# Patient Record
Sex: MALE | Race: WHITE | NOT HISPANIC OR LATINO | Employment: OTHER | URBAN - METROPOLITAN AREA
[De-identification: names, ages, dates, MRNs, and addresses within clinical notes are randomized per-mention and may not be internally consistent; named-entity substitution may affect disease eponyms.]

---

## 2017-10-26 ENCOUNTER — TELEPHONE (OUTPATIENT)
Dept: ORTHOPEDIC SURGERY | Facility: CLINIC | Age: 62
End: 2017-10-26

## 2017-11-02 ENCOUNTER — OFFICE VISIT (OUTPATIENT)
Dept: ORTHOPEDIC SURGERY | Facility: CLINIC | Age: 62
End: 2017-11-02

## 2017-11-02 VITALS — BODY MASS INDEX: 27.62 KG/M2 | TEMPERATURE: 98.2 F | WEIGHT: 176 LBS | HEIGHT: 67 IN

## 2017-11-02 DIAGNOSIS — M54.40 BILATERAL LOW BACK PAIN WITH SCIATICA, SCIATICA LATERALITY UNSPECIFIED, UNSPECIFIED CHRONICITY: ICD-10-CM

## 2017-11-02 DIAGNOSIS — M75.121 COMPLETE TEAR OF RIGHT ROTATOR CUFF: Primary | ICD-10-CM

## 2017-11-02 PROCEDURE — 73030 X-RAY EXAM OF SHOULDER: CPT | Performed by: ORTHOPAEDIC SURGERY

## 2017-11-02 PROCEDURE — 99204 OFFICE O/P NEW MOD 45 MIN: CPT | Performed by: ORTHOPAEDIC SURGERY

## 2017-11-02 PROCEDURE — 20610 DRAIN/INJ JOINT/BURSA W/O US: CPT | Performed by: ORTHOPAEDIC SURGERY

## 2017-11-02 RX ORDER — LANOLIN ALCOHOL/MO/W.PET/CERES
800 CREAM (GRAM) TOPICAL EVERY MORNING
COMMUNITY

## 2017-11-02 RX ORDER — TADALAFIL 5 MG/1
5 TABLET ORAL DAILY PRN
COMMUNITY
End: 2018-05-18 | Stop reason: HOSPADM

## 2017-11-02 RX ORDER — GABAPENTIN 100 MG/1
100 CAPSULE ORAL NIGHTLY PRN
Refills: 0 | COMMUNITY
Start: 2017-10-24

## 2017-11-02 RX ORDER — CIMETIDINE 400 MG/1
TABLET, FILM COATED ORAL
Refills: 0 | COMMUNITY
Start: 2017-10-31 | End: 2017-12-29 | Stop reason: SDUPTHER

## 2017-11-02 RX ORDER — ALPRAZOLAM 1 MG/1
TABLET ORAL
Refills: 0 | COMMUNITY
Start: 2017-10-20 | End: 2017-12-29 | Stop reason: SDUPTHER

## 2017-11-02 RX ORDER — HYDROCODONE BITARTRATE AND ACETAMINOPHEN 10; 325 MG/1; MG/1
TABLET ORAL
Refills: 0 | COMMUNITY
Start: 2017-10-20 | End: 2017-12-29 | Stop reason: SDUPTHER

## 2017-11-02 RX ADMIN — METHYLPREDNISOLONE ACETATE 80 MG: 80 INJECTION, SUSPENSION INTRA-ARTICULAR; INTRALESIONAL; INTRAMUSCULAR; SOFT TISSUE at 12:26

## 2017-11-02 RX ADMIN — BUPIVACAINE HYDROCHLORIDE 4 ML: 5 INJECTION, SOLUTION EPIDURAL; INTRACAUDAL at 12:26

## 2017-11-02 NOTE — PROGRESS NOTES
New Shoulder      Patient: Chandu Yost        YOB: 1955    Medical Record Number: [id number]        Chief Complaints: Right Shoulder Pain  Chief Complaint   Patient presents with   • Right Shoulder - Establish Care, Pain           History of Present Illness: This is a  61 y.o. male who presents with Complaints of right shoulder pain.  He is status post left shoulder surgery.  He states he fell 8/31/17 (his MRI reports his fall was on 8/8/17 however he clarified it is on 8/31/17.  He slipped on grease at [employer] injuring his shoulder.  He had neck pain and back pain before the fall they are worse now and he has shoulder pain.  Shoulder pain is described as severe constant stabbing aching pain with driving he is disabled his past medical history is remarkable for stomach ulcers and COPD          Allergies:   Allergies   Allergen Reactions   • Codeine Itching   • Latex Hives       Medications:   Home Medications:  No current outpatient prescriptions on file prior to visit.     No current facility-administered medications on file prior to visit.      Current Medications:  Scheduled Meds:  Continuous Infusions:  No current facility-administered medications for this visit.   PRN Meds:.    History reviewed. No pertinent past medical history.   No past surgical history on file.     Social History     Occupational History   • Not on file.     Social History Main Topics   • Smoking status: Not on file   • Smokeless tobacco: Not on file   • Alcohol use Not on file   • Drug use: Not on file   • Sexual activity: Not on file    Social History     Social History Narrative   • No narrative on file      History reviewed. No pertinent family history.          Review of Systems: 14 point review of systems are remarkable for the pertinent positives listed in the chart by the patient the remainder are negative    Review of Systems      Physical Exam: 61 y.o. male  General Appearance:    Alert, cooperative,  "in no acute distress                 Vitals:    11/02/17 1155   Temp: 98.2 °F (36.8 °C)   TempSrc: Temporal Artery    Weight: 176 lb (79.8 kg)   Height: 67\" (170.2 cm)      Patient is alert and read ×3 no acute distress appears her above-listed at height weight and age.  Affect is normal respiratory rate is normal unlabored. Heart rate regular rate rhythm, sclera, dentition and hearing are normal for the purpose of this exam.    Ortho Exam Physical exam of the right shoulder reveals no overlying skin changes no lymphedema no lymphadenopathy.  Patient has active flexion 170 with mild symptoms abduction is similar external rotation is to 40 and internal rotation to the upper lumbar spine with mild symptoms.  Patient has good rotator cuff strength 4 over 5 with isometric strength testing with pain.  Patient has a positive impingement and a positive De Los Santos sign.  Patient has good cervical range of motion which is full and asymptomatic no radicular symptoms.  Patient has a normal elbow exam.  Good distal pulses are present  Patient has pain with overhead activity and a positive Neer sign and a positive empty can sign    Large Joint Arthrocentesis  Date/Time: 11/2/2017 12:26 PM  Consent given by: patient  Site marked: site marked  Timeout: Immediately prior to procedure a time out was called to verify the correct patient, procedure, equipment, support staff and site/side marked as required   Supporting Documentation  Indications: pain   Procedure Details  Location: shoulder - R subacromial bursa  Preparation: Patient was prepped and draped in the usual sterile fashion  Needle size: 25 G  Approach: posterior  Medications administered: 80 mg methylPREDNISolone acetate 80 MG/ML; 4 mL bupivacaine (PF) 0.5 %  Patient tolerance: patient tolerated the procedure well with no immediate complications                Radiology:   AP, Scapular Y and Axillary Lateral of the right shoulder were ordered/reviewed to evauate shoulder " pain.  I've no comparative films these show some acromioclavicular arthritis perhaps some mild narrowing of the subacromial space he also comes with an MRI from [facility] which I did review this shows a massive rotator cuff tear that measures approximately 5 cm with muscular atrophy I have reviewed the films and agree with the findings    Assessment/Plan:    Right shoulder pain and massive rotator cuff tear.  The rotator cuff tear did not occur at the fall this year.  I think this is a long-standing tear but the fall made his symptoms worse and decreased his function.  The plan will be to proceed with an injection to see if we can decrease his symptoms, start him into physical therapy and I'll see him back 4-6 weeks.  The only surgical option he would have would be a reverse shoulder replacement

## 2017-11-05 RX ORDER — BUPIVACAINE HYDROCHLORIDE 5 MG/ML
4 INJECTION, SOLUTION EPIDURAL; INTRACAUDAL
Status: COMPLETED | OUTPATIENT
Start: 2017-11-02 | End: 2017-11-02

## 2017-11-05 RX ORDER — METHYLPREDNISOLONE ACETATE 80 MG/ML
80 INJECTION, SUSPENSION INTRA-ARTICULAR; INTRALESIONAL; INTRAMUSCULAR; SOFT TISSUE
Status: COMPLETED | OUTPATIENT
Start: 2017-11-02 | End: 2017-11-02

## 2017-11-28 ENCOUNTER — CONSULT (OUTPATIENT)
Dept: ORTHOPEDIC SURGERY | Facility: CLINIC | Age: 62
End: 2017-11-28

## 2017-11-28 VITALS — WEIGHT: 170 LBS | TEMPERATURE: 98.6 F | HEIGHT: 67 IN | BODY MASS INDEX: 26.68 KG/M2

## 2017-11-28 DIAGNOSIS — M54.50 LUMBAR SPINE PAIN: Primary | ICD-10-CM

## 2017-11-28 DIAGNOSIS — M54.2 NECK PAIN: ICD-10-CM

## 2017-11-28 DIAGNOSIS — G89.29 CHRONIC LOW BACK PAIN, UNSPECIFIED BACK PAIN LATERALITY, WITH SCIATICA PRESENCE UNSPECIFIED: ICD-10-CM

## 2017-11-28 DIAGNOSIS — M54.5 CHRONIC LOW BACK PAIN, UNSPECIFIED BACK PAIN LATERALITY, WITH SCIATICA PRESENCE UNSPECIFIED: ICD-10-CM

## 2017-11-28 PROCEDURE — 72100 X-RAY EXAM L-S SPINE 2/3 VWS: CPT | Performed by: ORTHOPAEDIC SURGERY

## 2017-11-28 PROCEDURE — 99214 OFFICE O/P EST MOD 30 MIN: CPT | Performed by: ORTHOPAEDIC SURGERY

## 2017-11-28 RX ORDER — ATENOLOL 50 MG/1
100 TABLET ORAL EVERY MORNING
Refills: 0 | COMMUNITY
Start: 2017-11-01

## 2017-11-28 RX ORDER — NIACIN 500 MG
125 TABLET ORAL EVERY MORNING
COMMUNITY

## 2017-11-28 RX ORDER — CHOLECALCIFEROL (VITAMIN D3) 125 MCG
500 CAPSULE ORAL 2 TIMES DAILY
COMMUNITY
End: 2017-12-29 | Stop reason: SDUPTHER

## 2017-11-28 RX ORDER — ASPIRIN 81 MG/1
81 TABLET ORAL DAILY
COMMUNITY

## 2017-11-28 RX ORDER — ALBUTEROL SULFATE 90 UG/1
2 AEROSOL, METERED RESPIRATORY (INHALATION) EVERY 4 HOURS PRN
COMMUNITY

## 2017-11-28 RX ORDER — PNV NO.95/FERROUS FUM/FOLIC AC 28MG-0.8MG
2 TABLET ORAL EVERY MORNING
COMMUNITY

## 2017-11-28 NOTE — PROGRESS NOTES
New patient or new problem visit    Chief Complaint   Patient presents with   • Lumbar Spine - Establish Care   • Cervical Spine - Establish Care       HPI: He complains of neck and back pain, which complicates treatment of his shoulder injury.  Dr. Blum fixed his left shoulder but the right shoulder is arthritic and would likely need a shoulder replacement, apparently.  He denies numbness tingling weakness.  The neck pain is worse than the back pain and is severe constant stabbing and aching.  Her practice helped immensely but lying on the right shoulder is difficult and that's a position in which this was previously administered.  He wants to continue chiropractic.  A slip and fall at [email or web address] 3 months ago seems to have made the pain worse.    PFSH: See chart- reviewed    Review of Systems   Constitutional: Negative for activity change, appetite change, chills, diaphoresis, fatigue, fever and unexpected weight change.   HENT: Positive for hearing loss. Negative for congestion, nosebleeds, postnasal drip, sore throat, tinnitus and trouble swallowing.    Eyes: Negative.  Negative for pain and visual disturbance.   Respiratory: Positive for shortness of breath. Negative for apnea, cough, chest tightness and wheezing.    Cardiovascular: Positive for chest pain. Negative for palpitations.   Gastrointestinal: Negative.  Negative for abdominal pain, blood in stool, diarrhea and nausea.   Endocrine: Negative.    Genitourinary: Positive for difficulty urinating. Negative for dysuria.   Musculoskeletal: Positive for back pain, gait problem, myalgias, neck pain and neck stiffness. Negative for arthralgias and joint swelling.   Skin: Negative.  Negative for color change.   Allergic/Immunologic: Negative.    Neurological: Positive for headaches. Negative for light-headedness and numbness.   Hematological: Negative.    Psychiatric/Behavioral: Positive for dysphoric mood and sleep disturbance. Negative for agitation.  The patient is nervous/anxious.        PE: Constitutional: Vital signs above-noted.  Awake, alert and oriented    Psychiatric: Affect and insight do not appear grossly disturbed.    Pulmonary: Breathing is unlabored, color is good.    Skin: Warm, dry and normal turgor.  He is color fully tattooed with Panthers  and skulls.    Cardiac:  radial pulses intact.  No arm edema.    Eyesight and hearing appear adequate for examination purposes    Musculoskeletal: Cervical Posture is unremarkable to coronal and sagittal inspection.  Motion appears stiff.  The skin about the area is intact.  There is no palpable or visible deformity.  There is [no] local spasm. There is mild tenderness to percussion and palpation of the spine.     Neurologic:  In the [bilateral] upper extremities there is [no evidence of atrophy].  Motor function is undisturbed in shoulder abduction, elbow flexion, wrist extension, finger extension, triceps extension, or finger abduction[   ].  Sensation appears symmetrically intact to light touch[   ].  Reflexes are [2+ and symmetrical] in the biceps, brachioradialis, and triceps. Rosario test is [negative].  Gait appears [undisturbed].  Spurling test is [negative].    Musculoskeletal: Lumbar Posture is [unremarkable] to coronal and sagittal inspection.  Motion appears [undisturbed].  The skin about the area is [intact].  There is [no palpable] or [visible] deformity.  There is [no] local spasm.   There is mild tenderness to percussion and palpation of the spine.      Neurologic:  In the [bilateral] lower extremities there is [no evidence of atrophy].  Motor function is undisturbed in quadriceps, EHL, and gastrocnemius[   ]   Sensation appears symmetrically intact to light touch[   ].  Reflexes are [2+ and symmetrical] in the patellae and untested in the Achilles.  Clonus is [absent]..  Gait appears [undisturbed].  SLR test [negative]      MEDICAL DECISION MAKING    XRAY: Neck films from outside 5 view show  mild degenerative change and P otherwise unremarkable.  Lumbar films obtained in my office today show multilevel spondylosis and a hint of L4-5 anterolisthesis.  No comparison data available    Other: n/a    Impression: Cervical and lumbar pain, chronic    Plan: Continue chiropractic try traction.  Follow-up when necessary

## 2017-12-04 ENCOUNTER — CONSULT (OUTPATIENT)
Dept: ORTHOPEDIC SURGERY | Facility: CLINIC | Age: 62
End: 2017-12-04

## 2017-12-04 VITALS — WEIGHT: 170 LBS | TEMPERATURE: 98.2 F | HEIGHT: 67 IN | BODY MASS INDEX: 26.68 KG/M2

## 2017-12-04 DIAGNOSIS — M75.121 COMPLETE TEAR OF RIGHT ROTATOR CUFF: Primary | ICD-10-CM

## 2017-12-04 PROCEDURE — 99214 OFFICE O/P EST MOD 30 MIN: CPT | Performed by: ORTHOPAEDIC SURGERY

## 2017-12-04 RX ORDER — TRIAMCINOLONE ACETONIDE 1 MG/G
CREAM TOPICAL
Refills: 0 | COMMUNITY
Start: 2017-12-01 | End: 2017-12-29 | Stop reason: SDUPTHER

## 2017-12-04 RX ORDER — CEFAZOLIN SODIUM 2 G/100ML
2 INJECTION, SOLUTION INTRAVENOUS ONCE
Status: CANCELLED | OUTPATIENT
Start: 2018-01-25 | End: 2018-01-25

## 2017-12-04 NOTE — PROGRESS NOTES
Patient: Chandu Yost    YOB: 1955    Medical Record Number: [id number]    Chief Complaints:  Right shoulder pain and weakness    History of Present Illness:     62 y.o. male patient who presents for evaluation of his right shoulder.  He is referred by Dr. Blum.  He fell in August and injured the right shoulder.  Prior to this, he did have problems with his right shoulder but these became significantly worse after the fall.  He describes his pain as severe, constant, and both aching and stabbing.  He has trouble raising the arm without the assistance of his left.  He cannot perform tasks at or above shoulder level.  He describes associated clicking and popping.  He has had one previous injection which helped for a period of approximately 1 week.  He has seen another orthopedist for this issue and was told that he needed an arthroplasty.  He comes to me for yet another opinion.    Allergies:   Allergies   Allergen Reactions   • Codeine Itching   • Latex Hives       Home Medications:    Current Outpatient Prescriptions:   •  albuterol (PROVENTIL HFA;VENTOLIN HFA) 108 (90 Base) MCG/ACT inhaler, Inhale 2 puffs Every 4 (Four) Hours As Needed for Wheezing., Disp: , Rfl:   •  ALPRAZolam (XANAX) 1 MG tablet, take 1 tablet by mouth three times a day, Disp: , Rfl: 0  •  aspirin 81 MG EC tablet, Take 81 mg by mouth Daily., Disp: , Rfl:   •  atenolol (TENORMIN) 50 MG tablet, Take 50 mg by mouth Daily., Disp: , Rfl: 0  •  cimetidine (TAGAMET) 400 MG tablet, take 2 tablet by mouth twice a day, Disp: , Rfl: 0  •  Ferrous Sulfate (IRON) 325 (65 Fe) MG tablet, Take  by mouth., Disp: , Rfl:   •  folic acid (FOLVITE) 400 MCG tablet, Take 400 mcg by mouth Daily., Disp: , Rfl:   •  gabapentin (NEURONTIN) 100 MG capsule, Take 100 mg by mouth 2 (Two) Times a Day., Disp: , Rfl: 0  •  HYDROcodone-acetaminophen (NORCO)  MG per tablet, take 1 tablet by mouth four times a day, Disp: , Rfl: 0  •  niacin 500 MG  tablet, Take 500 mg by mouth Every Night., Disp: , Rfl:   •  tadalafil (CIALIS) 5 MG tablet, Take 5 mg by mouth Daily As Needed for erectile dysfunction., Disp: , Rfl:   •  triamcinolone (KENALOG) 0.1 % cream, apply to affected area once daily, Disp: , Rfl: 0  •  vitamin B-12 (CYANOCOBALAMIN) 500 MCG tablet, Take 500 mcg by mouth 2 (Two) Times a Day., Disp: , Rfl:   •  Vitamin Mixture (ANGELIC-C PO), Take 500 mg by mouth 2 (Two) Times a Day., Disp: , Rfl:     Past Medical History:   Diagnosis Date   • Anxiety    • Depression    • Hypertension    • Stomach ulcer        History reviewed. No pertinent surgical history.    Social History     Occupational History   • Not on file.     Social History Main Topics   • Smoking status: Never Smoker   • Smokeless tobacco: Never Used   • Alcohol use Yes   • Drug use: No   • Sexual activity: Not on file      Social History     Social History Narrative       History reviewed. No pertinent family history.    Review of Systems:      Constitutional: Denies fever, shaking or chills   Eyes: Denies change in visual acuity   HEENT: Denies nasal congestion or sore throat   Respiratory: Denies cough or shortness of breath   Cardiovascular: Denies chest pain or edema  Endocrine: Denies tremors, palpitations, intolerance of heat or cold, polyuria, polydipsia.  GI: Denies abdominal pain, nausea, vomiting, bloody stools or diarrhea  : Denies frequency, urgency, incontinence, retention, or nocturia.  Musculoskeletal: Denies numbness tingling or loss of motor function except as above  Integument: Denies rash, lesion or ulceration   Neurologic: Denies headache or focal weakness, deficits  Heme: Denies epistaxis, spontaneous or excessive bleeding, epistaxis, hematuria, melena, fatigue, enlarged or tender lymph nodes.      All other pertinent positives and negatives as noted above in HPI.    Physical Exam: 62 y.o. male     Vitals:    12/04/17 1308   Temp: 98.2 °F (36.8 °C)   TempSrc: Temporal  "Artery    Weight: 170 lb (77.1 kg)   Height: 67\" (170.2 cm)     General:  Patient is awake and alert.  Appears in no acute distress or discomfort.    Psych:  Affect and demeanor are appropriate.    Eyes:  Conjunctiva and sclera appear grossly normal.  Eyes track well and EOM seem to be intact.    Ears:  No gross abnormalities.  Hearing adequate for the exam.    Cardiovascular:  Regular rate and rhythm.    Lungs:  Good chest expansion.  Breathing unlabored.    Extremities: Right shoulder is examined.  Skin is benign.  Moderate bursal effusion.  Moderate tenderness anteriorly along the interval and biceps.  Passive motion is full.  Active motion is limited and uncomfortable.  He struggles with mid-arc elevation.  He has 4 out of 5 strength with elevation in the scapular plane and resisted external rotation.  10° external rotation lag sign.  Negative Hornblower's.  Positive Bear huggers maneuver.  Positive belly press maneuver.  Positive speeds and Yergason's maneuvers.  Axillary nerve sensation is intact and he can fire his deltoid on exam.  Good motor and sensory function in the lower arm and hand.  Palpable radial pulse.  Brisk cap refill.    Imaging:  Previous x-rays of the right shoulder and his previous MRI are reviewed.  The x-rays show an acromiohumeral interval measuring 8 mm.  I do not see any other significant findings.  His MRI shows a massive, retracted rotator cuff tear involving the supraspinatus, infraspinatus and subscapularis.  There is subluxation of the biceps tendon and superior migration of the humeral head.    Assessment/Plan:  Right shoulder massive, retracted, irreparable rotator cuff tear and early cuff tear arthropathy    The risks, benefits, and alternatives to a reverse total shoulder arthroplasty were discussed with him in detail.  I spent the better part of 20 minutes talking with him just about the surgery itself and all that that entails.  He tells me that he is interested in pursuing " that.  I did ask him about his history of pulmonary problems.  It sounds like he has not seen a pulmonologist but has been diagnosed with COPD.  I think it would be prudent for him to get clearance through a pulmonologist before surgery.  He understands and agrees.  Going forward, we will look at getting him scheduled for surgery pending clearance.  I will see him back again for a preoperative visit.    Rafa Correa MD    12/04/2017

## 2017-12-29 ENCOUNTER — APPOINTMENT (OUTPATIENT)
Dept: PREADMISSION TESTING | Facility: HOSPITAL | Age: 62
End: 2017-12-29

## 2017-12-29 VITALS
HEIGHT: 67 IN | SYSTOLIC BLOOD PRESSURE: 142 MMHG | WEIGHT: 170 LBS | RESPIRATION RATE: 20 BRPM | BODY MASS INDEX: 26.68 KG/M2 | TEMPERATURE: 97.7 F | HEART RATE: 67 BPM | DIASTOLIC BLOOD PRESSURE: 77 MMHG

## 2017-12-29 DIAGNOSIS — M75.121 COMPLETE TEAR OF RIGHT ROTATOR CUFF: ICD-10-CM

## 2017-12-29 LAB
ANION GAP SERPL CALCULATED.3IONS-SCNC: 15.3 MMOL/L
BILIRUB UR QL STRIP: NEGATIVE
BUN BLD-MCNC: 15 MG/DL (ref 8–23)
BUN/CREAT SERPL: 14.9 (ref 7–25)
CALCIUM SPEC-SCNC: 9.3 MG/DL (ref 8.6–10.5)
CHLORIDE SERPL-SCNC: 100 MMOL/L (ref 98–107)
CLARITY UR: CLEAR
CO2 SERPL-SCNC: 24.7 MMOL/L (ref 22–29)
COLOR UR: YELLOW
CREAT BLD-MCNC: 1.01 MG/DL (ref 0.76–1.27)
DEPRECATED RDW RBC AUTO: 46.8 FL (ref 37–54)
ERYTHROCYTE [DISTWIDTH] IN BLOOD BY AUTOMATED COUNT: 12.7 % (ref 11.5–14.5)
GFR SERPL CREATININE-BSD FRML MDRD: 75 ML/MIN/1.73
GLUCOSE BLD-MCNC: 99 MG/DL (ref 65–99)
GLUCOSE UR STRIP-MCNC: NEGATIVE MG/DL
HCT VFR BLD AUTO: 38.1 % (ref 40.4–52.2)
HGB BLD-MCNC: 12 G/DL (ref 13.7–17.6)
HGB UR QL STRIP.AUTO: NEGATIVE
KETONES UR QL STRIP: NEGATIVE
LEUKOCYTE ESTERASE UR QL STRIP.AUTO: NEGATIVE
MCH RBC QN AUTO: 31.6 PG (ref 27–32.7)
MCHC RBC AUTO-ENTMCNC: 31.5 G/DL (ref 32.6–36.4)
MCV RBC AUTO: 100.3 FL (ref 79.8–96.2)
NITRITE UR QL STRIP: NEGATIVE
PH UR STRIP.AUTO: 5.5 [PH] (ref 5–8)
PLATELET # BLD AUTO: 217 10*3/MM3 (ref 140–500)
PMV BLD AUTO: 10.1 FL (ref 6–12)
POTASSIUM BLD-SCNC: 4.5 MMOL/L (ref 3.5–5.2)
PROT UR QL STRIP: NEGATIVE
RBC # BLD AUTO: 3.8 10*6/MM3 (ref 4.6–6)
SODIUM BLD-SCNC: 140 MMOL/L (ref 136–145)
SP GR UR STRIP: 1.01 (ref 1–1.03)
UROBILINOGEN UR QL STRIP: NORMAL
WBC NRBC COR # BLD: 7.93 10*3/MM3 (ref 4.5–10.7)

## 2017-12-29 PROCEDURE — 36415 COLL VENOUS BLD VENIPUNCTURE: CPT

## 2017-12-29 PROCEDURE — 80048 BASIC METABOLIC PNL TOTAL CA: CPT | Performed by: ORTHOPAEDIC SURGERY

## 2017-12-29 PROCEDURE — 85027 COMPLETE CBC AUTOMATED: CPT | Performed by: ORTHOPAEDIC SURGERY

## 2017-12-29 PROCEDURE — 81003 URINALYSIS AUTO W/O SCOPE: CPT | Performed by: ORTHOPAEDIC SURGERY

## 2017-12-29 RX ORDER — MELATONIN
1000 DAILY
COMMUNITY

## 2017-12-29 RX ORDER — ALPRAZOLAM 1 MG/1
1 TABLET ORAL 3 TIMES DAILY PRN
COMMUNITY

## 2017-12-29 RX ORDER — HYDROCODONE BITARTRATE AND ACETAMINOPHEN 10; 325 MG/1; MG/1
1 TABLET ORAL 4 TIMES DAILY
COMMUNITY
End: 2018-05-18 | Stop reason: HOSPADM

## 2017-12-29 RX ORDER — CIMETIDINE 800 MG
800 TABLET ORAL 2 TIMES DAILY
COMMUNITY
End: 2018-06-29

## 2017-12-29 RX ORDER — TRIAMCINOLONE ACETONIDE 1 MG/G
1 CREAM TOPICAL DAILY PRN
COMMUNITY

## 2017-12-29 RX ORDER — CHLORHEXIDINE GLUCONATE 500 MG/1
1 CLOTH TOPICAL TAKE AS DIRECTED
COMMUNITY
End: 2018-05-18 | Stop reason: HOSPADM

## 2017-12-29 RX ORDER — FLUTICASONE PROPIONATE 50 MCG
2 SPRAY, SUSPENSION (ML) NASAL DAILY
COMMUNITY

## 2017-12-29 NOTE — DISCHARGE INSTRUCTIONS

## 2018-01-05 ENCOUNTER — OFFICE VISIT (OUTPATIENT)
Dept: ORTHOPEDIC SURGERY | Facility: CLINIC | Age: 63
End: 2018-01-05

## 2018-01-05 ENCOUNTER — HOSPITAL ENCOUNTER (OUTPATIENT)
Dept: GENERAL RADIOLOGY | Facility: HOSPITAL | Age: 63
Discharge: HOME | End: 2018-01-05
Attending: ORTHOPAEDIC SURGERY | Admitting: ORTHOPAEDIC SURGERY

## 2018-01-05 VITALS — WEIGHT: 170 LBS | BODY MASS INDEX: 26.68 KG/M2 | TEMPERATURE: 98 F | HEIGHT: 67 IN

## 2018-01-05 DIAGNOSIS — Z01.818 PRE-OP EVALUATION: ICD-10-CM

## 2018-01-05 DIAGNOSIS — Z01.818 PRE-OP EVALUATION: Primary | ICD-10-CM

## 2018-01-05 PROCEDURE — S0260 H&P FOR SURGERY: HCPCS | Performed by: ORTHOPAEDIC SURGERY

## 2018-01-05 PROCEDURE — 71046 X-RAY EXAM CHEST 2 VIEWS: CPT

## 2018-01-05 NOTE — PROGRESS NOTES
"   History & Physical         Patient: Chandu Yost    YOB: 1955    Medical Record Number: [id number]    Chief Complaints:   Chief Complaint   Patient presents with   • Right Shoulder - Pre-op Exam, Pain       History of Present Illness: 62 y.o. male presents with   Chief Complaint   Patient presents with   • Right Shoulder - Pre-op Exam, Pain   . Reports a long history of progressively worsening pain, motion loss, and dysfunction.  Describes current pain as severe.  Has failed prolonged conservative treatment.  Denies any new complaints or issues.  Of note, he has not yet followed through with the recommendation for medical clearance.  He tells me that he did speak with his family doctor they told him \"everything was fine\".  There is no documentation to this effect.    Allergies:   Allergies   Allergen Reactions   • Codeine Itching   • Latex Hives       Medications:   Home Medications:    Current Outpatient Prescriptions:   •  albuterol (PROVENTIL HFA;VENTOLIN HFA) 108 (90 Base) MCG/ACT inhaler, Inhale 2 puffs Every 4 (Four) Hours As Needed for Wheezing., Disp: , Rfl:   •  ALPRAZolam (XANAX) 1 MG tablet, Take 1 mg by mouth 3 (Three) Times a Day., Disp: , Rfl:   •  aspirin 81 MG EC tablet, Take 81 mg by mouth Daily. To stop before surgery, Disp: , Rfl:   •  atenolol (TENORMIN) 50 MG tablet, Take 100 mg by mouth Every Morning., Disp: , Rfl: 0  •  calcium carbonate (TUMS ULTRA 1000) 1000 MG chewable tablet, Chew 1,000 mg As Needed for Indigestion or Heartburn., Disp: , Rfl:   •  Chlorhexidine Gluconate Cloth 2 % pads, Apply 1 application topically. USE AS DIRECTED PREOP, Disp: , Rfl:   •  cholecalciferol (VITAMIN D3) 1000 units tablet, Take 1,000 Units by mouth Daily., Disp: , Rfl:   •  cimetidine (TAGAMET) 800 MG tablet, Take 800 mg by mouth 2 (Two) Times a Day., Disp: , Rfl:   •  Cyanocobalamin (VITAMIN B-12 IJ), Inject  as directed Every 30 (Thirty) Days. In MD office, Disp: , Rfl:   •  Ferrous " Sulfate (IRON) 325 (65 Fe) MG tablet, Take 1 tablet by mouth Every Morning., Disp: , Rfl:   •  fluticasone (FLONASE) 50 MCG/ACT nasal spray, 2 sprays into each nostril Daily., Disp: , Rfl:   •  folic acid (FOLVITE) 400 MCG tablet, Take 800 mcg by mouth Every Morning., Disp: , Rfl:   •  gabapentin (NEURONTIN) 100 MG capsule, Take 100 mg by mouth At Night As Needed., Disp: , Rfl: 0  •  HYDROcodone-acetaminophen (NORCO)  MG per tablet, Take 1 tablet by mouth 4 (Four) Times a Day., Disp: , Rfl:   •  mupirocin (BACTROBAN) 2 % nasal ointment, 1 application into each nostril. USE AS DIRECTED PREOP, Disp: , Rfl:   •  niacin 500 MG tablet, Take 125 mg by mouth Every Night., Disp: , Rfl:   •  tadalafil (CIALIS) 5 MG tablet, Take 5 mg by mouth Daily As Needed for erectile dysfunction. To stop before surgery, Disp: , Rfl:   •  triamcinolone (KENALOG) 0.1 % cream, Apply 1 application topically Every Morning., Disp: , Rfl:   •  Vitamin Mixture (ANGELIC-C PO), Take 1,000 mg by mouth Every Morning., Disp: , Rfl:     Past Medical History:   Diagnosis Date   • Acid reflux disease    • Anemia    • Anxiety    • Arthritis    • Depression    • Hypertension    • Migraine    • Rash     on back using a prescribtion cream   • Raynaud disease    • Rotator cuff tear     right   • Shoulder pain    • SOB (shortness of breath)     when around people who smoke & dust & pollen leaves uses inhaler   • Staph infection     history  right leg   • Stomach ulcer           Past Surgical History:   Procedure Laterality Date   • INGUINAL HERNIA REPAIR Right    • LUMBAR PUNCTURE     • ROTATOR CUFF REPAIR Left           Social History     Occupational History   • Not on file.     Social History Main Topics   • Smoking status: Former Smoker     Packs/day: 3.00     Years: 20.00     Types: Cigarettes     Quit date: 1985   • Smokeless tobacco: Former User     Types: Chew     Quit date: 1993   • Alcohol use 51.0 oz/week     84 Cans of beer, 1 Shots of liquor  per week   • Drug use: No   • Sexual activity: Not on file      Social History     Social History Narrative          Family History   Problem Relation Age of Onset   • Malig Hyperthermia Neg Hx        Review of Systems:     Constitutional:  Denies fever, shaking or chills   Eyes:  Denies change in visual acuity   HEENT:  Denies nasal congestion or sore throat   Respiratory:  Denies cough or shortness of breath   Cardiovascular:  Denies chest pain or edema   GI:  Denies abdominal pain, nausea, vomiting, bloody stools or diarrhea   Musculoskeletal:  Denies numbness tingling or loss of motor function except as outlined above in history of present illness.  Integument:  Denies rash, lesion or ulceration   Neurologic:  Denies headache or focal weakness, deficits      All other pertinent positives and negatives as noted above in HPI.    Physical Exam: 62 y.o. male     General:  Patient is awake and alert.  Appears in no acute distress or discomfort.    Psych:  Affect and demeanor are appropriate.    Eyes:  Conjunctiva and sclera appear grossly normal.  Eyes track well and EOM seem to be intact.    Ears:  No gross abnormalities.  Hearing adequate for the exam.    Cardiovascular:  Regular rate and rhythm.    Lungs:  Good chest expansion.  Breathing unlabored.    Lymph:  No palpable adenopathy about neck or axilla.    Neck:  Supple.  Normal ROM.  Negative Spurling's for shoulder or arm pain.    Right upper extremity:  Skin benign and intact without evidence for swelling, masses or atrophy.  No palpable masses.  Focal tenderness over the acromioclavicular joint with a positive active compression maneuver today.  Shoulder ROM limited and uncomfortable.  He struggles with mid-arc elevation.  Forward elevation in the scapular plane is 4 out of 5.  External rotation is 4 out of 5 as well and he has a 10° external rotation lag sign.  No evident instability or apprehension.  Hornblowers negative.   Good strength in deltoid, wrist  and hand.  Intact sensation in arm, hand.  Palpable radial pulse with brisk cap refill.    Diagnostic Tests:  Lab Results   Component Value Date    GLUCOSE 99 12/29/2017    CALCIUM 9.3 12/29/2017     12/29/2017    K 4.5 12/29/2017    CO2 24.7 12/29/2017     12/29/2017    BUN 15 12/29/2017    CREATININE 1.01 12/29/2017    EGFRIFNONA 75 12/29/2017    BCR 14.9 12/29/2017    ANIONGAP 15.3 12/29/2017     Lab Results   Component Value Date    WBC 7.93 12/29/2017    HGB 12.0 (L) 12/29/2017    HCT 38.1 (L) 12/29/2017    .3 (H) 12/29/2017     12/29/2017     No results found for: INR, PROTIME     Imaging:  Previous x-rays and MRI of the right shoulder are again reviewed.  The x-rays show moderate acromioclavicular arthritis and subtle superior migration of the humeral head but his acromiohumeral interval still measures normal.  The MRI shows a massive, retracted rotator cuff tear with significant atrophy along with moderate acromioclavicular arthritis.    Assessment:  Right shoulder rotator cuff tear arthropathy, symptomatic acromioclavicular arthritis    Plan: He will need medical clearance preoperatively.  I'm sending him for chest x-ray today for a baseline.  We have contacted his family physician and he now has an appointment with his family doctor next week.  We have spoken with her office and they have informed us that his pulmonary function has actually been quite good and that they will be able to provide his clearance.     We had a thorough discussion regarding the risks, benefits and alternatives to an arthroplasty and non-surgical management versus surgery.  I explained that surgical risks include infection, hematoma, hardware related complications including failure of fixation, loosening, fracture, persistent pain and/or loss of motion, iatrogenic nerve and/or blood vessel injury resulting in permanent weakness, numbness or dysfunction, DVT, PE, positioning related neuropraxia, and  anesthesia related complications resulting in death.  We discussed the indication for a reverse as opposed to a standard total shoulder and the risks inherent to the reverse including notching, glenoid loosening, instability, and traction related neuropraxia, any of which could result in persistent pain or problems requiring further surgery.  Lastly, we discussed the rehab and all that will be expected of the patient post operatively to ensure an optimal outcome.  I also recommend that we address his acromioclavicular arthritis in the same setting.  I explained that this will require a separate incision.  I explained that the risks are similar, particularly infection, hematoma, wound healing problems, and persistent pain.      The patient voiced understanding of the risks, benefits, and alternative forms of treatment that were discussed and the patient consents to proceed with the reverse arthroplasty with distal clavicle excision.   If he is not medically cleared, we can certainly delay his surgery.  I explained this to him and his wife and they understand and accept that possibility.      Date: 1/5/2018    Rafa Correa MD

## 2018-01-09 ENCOUNTER — TELEPHONE (OUTPATIENT)
Dept: ORTHOPEDIC SURGERY | Facility: CLINIC | Age: 63
End: 2018-01-09

## 2018-01-09 NOTE — TELEPHONE ENCOUNTER
Call the patient regarding his medical clearance.  I did contact his PCPs office and he was seen at the end of December.  At that time they had recommended that the patient have a CT scan of his chest is scheduled for January 2 and the patient was a no-show.  Patient states that he was not aware that he was to have the test done.  Chest x-ray was negative however the patient is having transportation issues and wishes to reschedule his surgery for a week or 2.  He is been sent to scheduling.  I also advised the patient to contact his PCP did discuss with them about getting this CT scan of his chest done prior to surgery

## 2018-01-31 ENCOUNTER — PREP FOR SURGERY (OUTPATIENT)
Dept: OTHER | Facility: HOSPITAL | Age: 63
End: 2018-01-31

## 2018-01-31 DIAGNOSIS — M12.811 ROTATOR CUFF TEAR ARTHROPATHY OF RIGHT SHOULDER: Primary | ICD-10-CM

## 2018-01-31 DIAGNOSIS — M75.101 ROTATOR CUFF TEAR ARTHROPATHY OF RIGHT SHOULDER: Primary | ICD-10-CM

## 2018-02-02 ENCOUNTER — APPOINTMENT (OUTPATIENT)
Dept: PREADMISSION TESTING | Facility: HOSPITAL | Age: 63
End: 2018-02-02

## 2018-02-02 VITALS
HEART RATE: 72 BPM | RESPIRATION RATE: 16 BRPM | OXYGEN SATURATION: 98 % | HEIGHT: 67 IN | BODY MASS INDEX: 27.15 KG/M2 | TEMPERATURE: 98.3 F | WEIGHT: 173 LBS | DIASTOLIC BLOOD PRESSURE: 88 MMHG | SYSTOLIC BLOOD PRESSURE: 135 MMHG

## 2018-02-02 DIAGNOSIS — M12.811 ROTATOR CUFF TEAR ARTHROPATHY OF RIGHT SHOULDER: ICD-10-CM

## 2018-02-02 DIAGNOSIS — M75.101 ROTATOR CUFF TEAR ARTHROPATHY OF RIGHT SHOULDER: ICD-10-CM

## 2018-02-02 LAB
ANION GAP SERPL CALCULATED.3IONS-SCNC: 13 MMOL/L
BILIRUB UR QL STRIP: NEGATIVE
BUN BLD-MCNC: 18 MG/DL (ref 8–23)
BUN/CREAT SERPL: 17.3 (ref 7–25)
CALCIUM SPEC-SCNC: 9.2 MG/DL (ref 8.6–10.5)
CHLORIDE SERPL-SCNC: 100 MMOL/L (ref 98–107)
CLARITY UR: CLEAR
CO2 SERPL-SCNC: 26 MMOL/L (ref 22–29)
COLOR UR: YELLOW
CREAT BLD-MCNC: 1.04 MG/DL (ref 0.76–1.27)
DEPRECATED RDW RBC AUTO: 44.6 FL (ref 37–54)
ERYTHROCYTE [DISTWIDTH] IN BLOOD BY AUTOMATED COUNT: 12.7 % (ref 11.5–14.5)
GFR SERPL CREATININE-BSD FRML MDRD: 72 ML/MIN/1.73
GLUCOSE BLD-MCNC: 99 MG/DL (ref 65–99)
GLUCOSE UR STRIP-MCNC: NEGATIVE MG/DL
HCT VFR BLD AUTO: 36.3 % (ref 40.4–52.2)
HGB BLD-MCNC: 11.9 G/DL (ref 13.7–17.6)
HGB UR QL STRIP.AUTO: NEGATIVE
KETONES UR QL STRIP: NEGATIVE
LEUKOCYTE ESTERASE UR QL STRIP.AUTO: NEGATIVE
MCH RBC QN AUTO: 31.8 PG (ref 27–32.7)
MCHC RBC AUTO-ENTMCNC: 32.8 G/DL (ref 32.6–36.4)
MCV RBC AUTO: 97.1 FL (ref 79.8–96.2)
NITRITE UR QL STRIP: NEGATIVE
PH UR STRIP.AUTO: 7 [PH] (ref 5–8)
PLATELET # BLD AUTO: 216 10*3/MM3 (ref 140–500)
PMV BLD AUTO: 10.1 FL (ref 6–12)
POTASSIUM BLD-SCNC: 4.2 MMOL/L (ref 3.5–5.2)
PROT UR QL STRIP: NEGATIVE
RBC # BLD AUTO: 3.74 10*6/MM3 (ref 4.6–6)
SODIUM BLD-SCNC: 139 MMOL/L (ref 136–145)
SP GR UR STRIP: 1.01 (ref 1–1.03)
UROBILINOGEN UR QL STRIP: NORMAL
WBC NRBC COR # BLD: 7.04 10*3/MM3 (ref 4.5–10.7)

## 2018-02-02 PROCEDURE — 81003 URINALYSIS AUTO W/O SCOPE: CPT | Performed by: ORTHOPAEDIC SURGERY

## 2018-02-02 PROCEDURE — 80048 BASIC METABOLIC PNL TOTAL CA: CPT | Performed by: ORTHOPAEDIC SURGERY

## 2018-02-02 PROCEDURE — 85027 COMPLETE CBC AUTOMATED: CPT | Performed by: ORTHOPAEDIC SURGERY

## 2018-02-02 PROCEDURE — 36415 COLL VENOUS BLD VENIPUNCTURE: CPT

## 2018-02-02 NOTE — DISCHARGE INSTRUCTIONS
Take the following medications the morning of surgery with a small sip of water:    TAGAMENT AND ATENOLOL    ARRIVE AT 11:00    General Instructions:  • Do not eat solid food after midnight the night before surgery.  • You may drink clear liquids day of surgery but must stop at least one hour before your hospital arrival time.  • It is beneficial for you to have a clear drink that contains carbohydrates the day of surgery.  We suggest a 12 to 20 ounce bottle of Gatorade or Powerade for non-diabetic patients or a 12 to 20 ounce bottle of G2 or Powerade Zero for diabetic patients. (Pediatric patients, are not advised to drink a 12 to 20 ounce carbohydrate drink)    Clear liquids are liquids you can see through.  Nothing red in color.     Plain water                               Sports drinks  Sodas                                   Gelatin (Jell-O)  Fruit juices without pulp such as white grape juice and apple juice  Popsicles that contain no fruit or yogurt  Tea or coffee (no cream or milk added)  Gatorade / Powerade  G2 / Powerade Zero    • Infants may have breast milk up to four hours before surgery.  • Infants drinking formula may drink formula up to six hours before surgery.   • Patients who avoid smoking, chewing tobacco and alcohol for 4 weeks prior to surgery have a reduced risk of post-operative complications.  Quit smoking as many days before surgery as you can.  • Do not smoke, use chewing tobacco or drink alcohol the day of surgery.   • If applicable bring your C-PAP/ BI-PAP machine.  • Bring any papers given to you in the doctor’s office.  • Wear clean comfortable clothes and socks.  • Do not wear contact lenses or make-up.  Bring a case for your glasses.   • Bring crutches or walker if applicable.  • Remove all piercings.  Leave jewelry and any other valuables at home.  • Hair extensions with metal clips must be removed prior to surgery.  • The Pre-Admission Testing nurse will instruct you to bring  medications if unable to obtain an accurate list in Pre-Admission Testing.        If you were given a blood bank ID arm band remember to bring it with you the day of surgery.    Preventing a Surgical Site Infection:  • For 2 to 3 days before surgery, avoid shaving with a razor because the razor can irritate skin and make it easier to develop an infection.  • The night prior to surgery sleep in a clean bed with clean clothing.  Do not allow pets to sleep with you.  • Shower on the morning of surgery using a fresh bar of anti-bacterial soap (such as Dial) and clean washcloth.  Dry with a clean towel and dress in clean clothing.  • Ask your surgeon if you will be receiving antibiotics prior to surgery.  • Make sure you, your family, and all healthcare providers clean their hands with soap and water or an alcohol based hand  before caring for you or your wound.    Day of surgery:  Upon arrival, a Pre-op nurse and Anesthesiologist will review your health history, obtain vital signs, and answer questions you may have.  The only belongings needed at this time will be your home medications and if applicable your C-PAP/BI-PAP machine.  If you are staying overnight your family can leave the rest of your belongings in the car and bring them to your room later.  A Pre-op nurse will start an IV and you may receive medication in preparation for surgery, including something to help you relax.  Your family will be able to see you in the Pre-op area.  While you are in surgery your family should notify the waiting room  if they leave the waiting room area and provide a contact phone number.    Please be aware that surgery does come with discomfort.  We want to make every effort to control your discomfort so please discuss any uncontrolled symptoms with your nurse.   Your doctor will most likely have prescribed pain medications.      If you are going home after surgery you will receive individualized written care  instructions before being discharged.  A responsible adult must drive you to and from the hospital on the day of your surgery and stay with you for 24 hours.    If you are staying overnight following surgery, you will be transported to your hospital room following the recovery period.  [facility] has all private rooms.    If you have any questions please call Pre-Admission Testing at [phone].  Deductibles and co-payments are collected on the day of service. Please be prepared to pay the required co-pay, deductible or deposit on the day of service as defined by your plan.    2% CHLORAHEXIDINE GLUCONATE* CLOTH  Preparing or “prepping” skin before surgery can reduce the risk of infection at the surgical site. To make the process easier, [facility] has chosen disposable cloths moistened with a rinse-free, 2% Chlorhexidine Gluconate (CHG) antiseptic solution. The steps below outline the prepping process and should be carefully followed.        Use the prep cloth on the area that is circled in the diagram             Directions Night before Surgery  1) Shower using a fresh bar of anti-bacterial soap (such as Dial) and clean washcloth.  Use a clean towel to completely dry your skin.  2) Do not use any lotions, oils or creams on your skin.  3) Open the package and remove 1 cloth, wipe your skin for 30 seconds in a circular motion.  Allow to dry for 3 minutes.  4) Repeat #3 with second cloth.  5) Do not touch your eyes, ears, or mouth with the prep cloth.  6) Allow the wet prep solution to air dry.  7) Discard the prep cloth and wash your hands with soap and water.   8) Dress in clean bed clothes and sleep on fresh clean bed sheets.   9) You may experience some temporary itching after the prep.    Directions Day of Surgery  1) Repeat steps 1,2,3,4,5,6,7, and 9.   2) Dress in clean clothes before coming to the hospital.    BACTROBAN NASAL OINTMENT  There are many germs normally in your nose.  Bactroban is an ointment that will help reduce these germs. Please follow these instructions for Bactroban use:    ____Two days before surgery in the evening Date________    ____The day before surgery in the morning  Date________    ____The day before surgery in the evening              Date________    ____The day of surgery in the morning    Date________    **Squirt ½ package of Bactroban Ointment onto a cotton applicator and apply to inside of 1st nostril.  Squirt the remaining Bactroban and apply to the inside of the other nostril.    PERIDEX- ORAL:  Use only if your surgeon has ordered  Use the night before and morning of surgery - Swish, gargle, and spit - do not swallow.

## 2018-02-08 ENCOUNTER — HOSPITAL ENCOUNTER (INPATIENT)
Facility: HOSPITAL | Age: 63
LOS: 1 days | Discharge: HOME | End: 2018-02-08
Attending: ORTHOPAEDIC SURGERY | Admitting: ORTHOPAEDIC SURGERY

## 2018-02-08 ENCOUNTER — ANESTHESIA EVENT (OUTPATIENT)
Dept: PERIOP | Facility: HOSPITAL | Age: 63
End: 2018-02-08

## 2018-02-08 ENCOUNTER — ANESTHESIA (OUTPATIENT)
Dept: PERIOP | Facility: HOSPITAL | Age: 63
End: 2018-02-08

## 2018-02-08 VITALS
HEART RATE: 64 BPM | RESPIRATION RATE: 20 BRPM | HEIGHT: 67 IN | BODY MASS INDEX: 27.55 KG/M2 | TEMPERATURE: 98 F | WEIGHT: 175.5 LBS | DIASTOLIC BLOOD PRESSURE: 92 MMHG | SYSTOLIC BLOOD PRESSURE: 175 MMHG | OXYGEN SATURATION: 100 %

## 2018-02-08 DIAGNOSIS — M75.121 COMPLETE TEAR OF RIGHT ROTATOR CUFF: ICD-10-CM

## 2018-02-08 RX ORDER — FENTANYL CITRATE 50 UG/ML
25 INJECTION, SOLUTION INTRAMUSCULAR; INTRAVENOUS
Status: DISCONTINUED | OUTPATIENT
Start: 2018-02-08 | End: 2018-02-08 | Stop reason: HOSPADM

## 2018-02-08 RX ORDER — SODIUM CHLORIDE, SODIUM LACTATE, POTASSIUM CHLORIDE, CALCIUM CHLORIDE 600; 310; 30; 20 MG/100ML; MG/100ML; MG/100ML; MG/100ML
9 INJECTION, SOLUTION INTRAVENOUS CONTINUOUS PRN
Status: DISCONTINUED | OUTPATIENT
Start: 2018-02-08 | End: 2018-02-08 | Stop reason: HOSPADM

## 2018-02-08 RX ORDER — MIDAZOLAM HYDROCHLORIDE 1 MG/ML
2 INJECTION INTRAMUSCULAR; INTRAVENOUS
Status: DISCONTINUED | OUTPATIENT
Start: 2018-02-08 | End: 2018-02-08 | Stop reason: HOSPADM

## 2018-02-08 RX ORDER — SODIUM CHLORIDE 0.9 % (FLUSH) 0.9 %
1-10 SYRINGE (ML) INJECTION AS NEEDED
Status: DISCONTINUED | OUTPATIENT
Start: 2018-02-08 | End: 2018-02-08 | Stop reason: HOSPADM

## 2018-02-08 RX ORDER — CEFAZOLIN SODIUM 2 G/100ML
2 INJECTION, SOLUTION INTRAVENOUS ONCE
Status: DISCONTINUED | OUTPATIENT
Start: 2018-02-08 | End: 2018-02-08 | Stop reason: HOSPADM

## 2018-02-08 RX ORDER — FAMOTIDINE 10 MG/ML
20 INJECTION, SOLUTION INTRAVENOUS
Status: DISCONTINUED | OUTPATIENT
Start: 2018-02-08 | End: 2018-02-08 | Stop reason: HOSPADM

## 2018-02-08 RX ORDER — MIDAZOLAM HYDROCHLORIDE 1 MG/ML
1 INJECTION INTRAMUSCULAR; INTRAVENOUS
Status: DISCONTINUED | OUTPATIENT
Start: 2018-02-08 | End: 2018-02-08 | Stop reason: HOSPADM

## 2018-02-08 NOTE — ANESTHESIA PREPROCEDURE EVALUATION
Anesthesia Evaluation     Patient summary reviewed   NPO Solid Status: > 8 hours             Airway   Mallampati: I  Neck ROM: full  no difficulty expected  Dental    (+) edentulous    Pulmonary    (+) a smoker Former,   Cardiovascular     Rhythm: regular    (+) hypertension,       Neuro/Psych  GI/Hepatic/Renal/Endo      Musculoskeletal     Abdominal    Substance History      OB/GYN          Other                        Anesthesia Plan    ASA 3     general     intravenous induction   Anesthetic plan and risks discussed with patient.  Use of blood products discussed with patient .

## 2018-02-09 ENCOUNTER — TELEPHONE (OUTPATIENT)
Dept: ORTHOPEDIC SURGERY | Facility: CLINIC | Age: 63
End: 2018-02-09

## 2018-02-09 DIAGNOSIS — L98.9 SKIN LESION: Primary | ICD-10-CM

## 2018-02-09 NOTE — TELEPHONE ENCOUNTER
Have contacted ' office for an appointment for dilation of the skin lesion on his chest.  Unfortunately their computers were down and will be back up until Monday.  Will contact him first thing on Monday morning.  Patient has been notified .

## 2018-02-12 NOTE — TELEPHONE ENCOUNTER
Have left a message with the dermatologist's office to contact me about setting up an appointment for this patient

## 2018-02-12 NOTE — TELEPHONE ENCOUNTER
Have made the patient appointment to see Dr. Weller on February 22 at 1:15 in the afternoon.  Information including phone number and address of her office as well as the appointment date and time have been given to the patient.  I've also advised him that he will need to log onto their website to complete the new patient paperwork and they will not see him unless the paperwork is complete.  Have left it open for him to call if he has any other questions or concerns.  Also made him an appointment to see Dr. Correa on March 5 per [facility]

## 2018-02-22 ENCOUNTER — TELEPHONE (OUTPATIENT)
Dept: ORTHOPEDIC SURGERY | Facility: CLINIC | Age: 63
End: 2018-02-22

## 2018-02-22 NOTE — TELEPHONE ENCOUNTER
PATIENT CALLED SAID THE DOCTORS APPT YOU SET HIM UP WITH DOES NOT TAKE HIS INSURANCE AND HE CANNOT AFFORD TO PAY OUT OF POCKET, IS THEIR ANOTHER PHYSICIAN YOU KNOW TO SEND HIM TOO?

## 2018-03-09 ENCOUNTER — TELEPHONE (OUTPATIENT)
Dept: ORTHOPEDIC SURGERY | Facility: CLINIC | Age: 63
End: 2018-03-09

## 2018-04-09 ENCOUNTER — TELEPHONE (OUTPATIENT)
Dept: ORTHOPEDIC SURGERY | Facility: CLINIC | Age: 63
End: 2018-04-09

## 2018-04-09 ENCOUNTER — PREP FOR SURGERY (OUTPATIENT)
Dept: OTHER | Facility: HOSPITAL | Age: 63
End: 2018-04-09

## 2018-04-09 DIAGNOSIS — M12.811 ROTATOR CUFF TEAR ARTHROPATHY OF RIGHT SHOULDER: Primary | ICD-10-CM

## 2018-04-09 DIAGNOSIS — M75.101 ROTATOR CUFF TEAR ARTHROPATHY OF RIGHT SHOULDER: Primary | ICD-10-CM

## 2018-04-09 RX ORDER — SODIUM CHLORIDE 0.9 % (FLUSH) 0.9 %
1-10 SYRINGE (ML) INJECTION AS NEEDED
Status: CANCELLED | OUTPATIENT
Start: 2018-04-09

## 2018-04-09 RX ORDER — CEFAZOLIN SODIUM 2 G/100ML
2 INJECTION, SOLUTION INTRAVENOUS ONCE
Status: CANCELLED | OUTPATIENT
Start: 2018-04-09 | End: 2018-04-09

## 2018-04-09 NOTE — TELEPHONE ENCOUNTER
I spoke with him.  He says that the lesion has been removed and he would like to get his surgery rescheduled.  We spoke and I have agreed to set him up for surgery again.  I do want to see him again for a preoperative visit just to check that area one more time.  He understands and agrees.

## 2018-04-26 PROBLEM — M75.101 ROTATOR CUFF TEAR ARTHROPATHY OF RIGHT SHOULDER: Status: ACTIVE | Noted: 2018-04-26

## 2018-04-26 PROBLEM — M12.811 ROTATOR CUFF TEAR ARTHROPATHY OF RIGHT SHOULDER: Status: ACTIVE | Noted: 2018-04-26

## 2018-05-08 ENCOUNTER — APPOINTMENT (OUTPATIENT)
Dept: PREADMISSION TESTING | Facility: HOSPITAL | Age: 63
End: 2018-05-08

## 2018-05-08 ENCOUNTER — TELEPHONE (OUTPATIENT)
Dept: ORTHOPEDIC SURGERY | Facility: CLINIC | Age: 63
End: 2018-05-08

## 2018-05-08 VITALS
BODY MASS INDEX: 29.27 KG/M2 | OXYGEN SATURATION: 97 % | WEIGHT: 186.5 LBS | SYSTOLIC BLOOD PRESSURE: 131 MMHG | TEMPERATURE: 97.1 F | HEART RATE: 67 BPM | RESPIRATION RATE: 16 BRPM | DIASTOLIC BLOOD PRESSURE: 75 MMHG | HEIGHT: 67 IN

## 2018-05-08 DIAGNOSIS — M12.811 ROTATOR CUFF TEAR ARTHROPATHY OF RIGHT SHOULDER: ICD-10-CM

## 2018-05-08 DIAGNOSIS — M75.101 ROTATOR CUFF TEAR ARTHROPATHY OF RIGHT SHOULDER: ICD-10-CM

## 2018-05-08 LAB
ANION GAP SERPL CALCULATED.3IONS-SCNC: 14.6 MMOL/L
BILIRUB UR QL STRIP: NEGATIVE
BUN BLD-MCNC: 19 MG/DL (ref 8–23)
BUN/CREAT SERPL: 16.5 (ref 7–25)
CALCIUM SPEC-SCNC: 8.8 MG/DL (ref 8.6–10.5)
CHLORIDE SERPL-SCNC: 100 MMOL/L (ref 98–107)
CLARITY UR: CLEAR
CO2 SERPL-SCNC: 25.4 MMOL/L (ref 22–29)
COLOR UR: YELLOW
CREAT BLD-MCNC: 1.15 MG/DL (ref 0.76–1.27)
DEPRECATED RDW RBC AUTO: 46.6 FL (ref 37–54)
ERYTHROCYTE [DISTWIDTH] IN BLOOD BY AUTOMATED COUNT: 12.9 % (ref 11.5–14.5)
GFR SERPL CREATININE-BSD FRML MDRD: 64 ML/MIN/1.73
GLUCOSE BLD-MCNC: 89 MG/DL (ref 65–99)
GLUCOSE UR STRIP-MCNC: NEGATIVE MG/DL
HCT VFR BLD AUTO: 38.5 % (ref 40.4–52.2)
HGB BLD-MCNC: 12.3 G/DL (ref 13.7–17.6)
HGB UR QL STRIP.AUTO: NEGATIVE
KETONES UR QL STRIP: NEGATIVE
LEUKOCYTE ESTERASE UR QL STRIP.AUTO: NEGATIVE
MCH RBC QN AUTO: 31.5 PG (ref 27–32.7)
MCHC RBC AUTO-ENTMCNC: 31.9 G/DL (ref 32.6–36.4)
MCV RBC AUTO: 98.7 FL (ref 79.8–96.2)
NITRITE UR QL STRIP: NEGATIVE
PH UR STRIP.AUTO: 6 [PH] (ref 5–8)
PLATELET # BLD AUTO: 208 10*3/MM3 (ref 140–500)
PMV BLD AUTO: 10.4 FL (ref 6–12)
POTASSIUM BLD-SCNC: 4 MMOL/L (ref 3.5–5.2)
PROT UR QL STRIP: NEGATIVE
RBC # BLD AUTO: 3.9 10*6/MM3 (ref 4.6–6)
SODIUM BLD-SCNC: 140 MMOL/L (ref 136–145)
SP GR UR STRIP: 1.01 (ref 1–1.03)
UROBILINOGEN UR QL STRIP: NORMAL
WBC NRBC COR # BLD: 7.7 10*3/MM3 (ref 4.5–10.7)

## 2018-05-08 PROCEDURE — 85027 COMPLETE CBC AUTOMATED: CPT | Performed by: ORTHOPAEDIC SURGERY

## 2018-05-08 PROCEDURE — 80048 BASIC METABOLIC PNL TOTAL CA: CPT | Performed by: ORTHOPAEDIC SURGERY

## 2018-05-08 PROCEDURE — 93005 ELECTROCARDIOGRAM TRACING: CPT

## 2018-05-08 PROCEDURE — 93010 ELECTROCARDIOGRAM REPORT: CPT | Performed by: INTERNAL MEDICINE

## 2018-05-08 PROCEDURE — 36415 COLL VENOUS BLD VENIPUNCTURE: CPT

## 2018-05-08 PROCEDURE — 81003 URINALYSIS AUTO W/O SCOPE: CPT | Performed by: ORTHOPAEDIC SURGERY

## 2018-05-08 RX ORDER — ASCORBIC ACID 500 MG
1000 TABLET ORAL DAILY
COMMUNITY

## 2018-05-08 NOTE — TELEPHONE ENCOUNTER
Received call from [facility] preadmission testing.  Patient was there today for preop labs and was complaining of frequent dizzy spells that he has had for the last 2 weeks.  Labs and EKG are within normal limits.  Patient was instructed by the nurse practitioner in [facility] and follow-up with his primary care physician before his upcoming surgery

## 2018-05-08 NOTE — DISCHARGE INSTRUCTIONS
Take the following medications the morning of surgery with a small sip of water:  CIMETIDINE, ALBUTEROL, ATENOLOL AND HYDROCODONE AS NEEDED    Arrive to hospital on your day of surgery at 9:00 AM.        General Instructions:  • Do not eat solid food after midnight the night before surgery.  • You may drink clear liquids day of surgery but must stop at least one hour before your hospital arrival time (NOTHING AFTER 8:00 AM)  • It is beneficial for you to have a clear drink that contains carbohydrates the day of surgery.  We suggest a 12 to 20 ounce bottle of Gatorade or Powerade for non-diabetic patients or a 12 to 20 ounce bottle of G2 or Powerade Zero for diabetic patients. (Pediatric patients, are not advised to drink a 12 to 20 ounce carbohydrate drink)    Clear liquids are liquids you can see through.  Nothing red in color.     Plain water                               Sports drinks  Sodas                                   Gelatin (Jell-O)  Fruit juices without pulp such as white grape juice and apple juice  Popsicles that contain no fruit or yogurt  Tea or coffee (no cream or milk added)  Gatorade / Powerade  G2 / Powerade Zero    • Infants may have breast milk up to four hours before surgery.  • Infants drinking formula may drink formula up to six hours before surgery.   • Patients who avoid smoking, chewing tobacco and alcohol for 4 weeks prior to surgery have a reduced risk of post-operative complications.  Quit smoking as many days before surgery as you can.  • Do not smoke, use chewing tobacco or drink alcohol the day of surgery.   • If applicable bring your C-PAP/ BI-PAP machine.  • Bring any papers given to you in the doctor’s office.  • Wear clean comfortable clothes and socks.  • Do not wear contact lenses or make-up.  Bring a case for your glasses.   • Bring crutches or walker if applicable.  • Remove all piercings.  Leave jewelry and any other valuables at home.  • Hair extensions with metal clips  must be removed prior to surgery.  • The Pre-Admission Testing nurse will instruct you to bring medications if unable to obtain an accurate list in Pre-Admission Testing.        If you were given a blood bank ID arm band remember to bring it with you the day of surgery.    Preventing a Surgical Site Infection:  • For 2 to 3 days before surgery, avoid shaving with a razor because the razor can irritate skin and make it easier to develop an infection.  • The night prior to surgery sleep in a clean bed with clean clothing.  Do not allow pets to sleep with you.  • Shower on the morning of surgery using a fresh bar of anti-bacterial soap (such as Dial) and clean washcloth.  Dry with a clean towel and dress in clean clothing.  • Ask your surgeon if you will be receiving antibiotics prior to surgery.  • Make sure you, your family, and all healthcare providers clean their hands with soap and water or an alcohol based hand  before caring for you or your wound.    Day of surgery:  Upon arrival, a Pre-op nurse and Anesthesiologist will review your health history, obtain vital signs, and answer questions you may have.  The only belongings needed at this time will be your home medications and if applicable your C-PAP/BI-PAP machine.  If you are staying overnight your family can leave the rest of your belongings in the car and bring them to your room later.  A Pre-op nurse will start an IV and you may receive medication in preparation for surgery, including something to help you relax.  Your family will be able to see you in the Pre-op area.  While you are in surgery your family should notify the waiting room  if they leave the waiting room area and provide a contact phone number.    Please be aware that surgery does come with discomfort.  We want to make every effort to control your discomfort so please discuss any uncontrolled symptoms with your nurse.   Your doctor will most likely have prescribed pain  medications.      If you are going home after surgery you will receive individualized written care instructions before being discharged.  A responsible adult must drive you to and from the hospital on the day of your surgery and stay with you for 24 hours.    If you are staying overnight following surgery, you will be transported to your hospital room following the recovery period.  [facility] has all private rooms.    If you have any questions please call Pre-Admission Testing at [phone].  Deductibles and co-payments are collected on the day of service. Please be prepared to pay the required co-pay, deductible or deposit on the day of service as defined by your plan.      2% CHLORAHEXIDINE GLUCONATE* CLOTH  Preparing or “prepping” skin before surgery can reduce the risk of infection at the surgical site. To make the process easier, [facility] has chosen disposable cloths moistened with a rinse-free, 2% Chlorhexidine Gluconate (CHG) antiseptic solution. The steps below outline the prepping process and should be carefully followed.        Use the prep cloth on the area that is circled in the diagram             Directions Night before Surgery  1) Shower using a fresh bar of anti-bacterial soap (such as Dial) and clean washcloth.  Use a clean towel to completely dry your skin.  2) Do not use any lotions, oils or creams on your skin.  3) Open the package and remove 1 cloth, wipe your skin for 30 seconds in a circular motion.  Allow to dry for 3 minutes.  4) Repeat #3 with second cloth.  5) Do not touch your eyes, ears, or mouth with the prep cloth.  6) Allow the wet prep solution to air dry.  7) Discard the prep cloth and wash your hands with soap and water.   8) Dress in clean bed clothes and sleep on fresh clean bed sheets.   9) You may experience some temporary itching after the prep.    Directions Day of Surgery  1) Repeat steps 1,2,3,4,5,6,7, and 9.   2) Dress in clean clothes before  coming to the hospital.    BACTROBAN NASAL OINTMENT  There are many germs normally in your nose. Bactroban is an ointment that will help reduce these germs. Please follow these instructions for Bactroban use:      ____The day before surgery in the morning  Date________    ____The day before surgery in the evening              Date________    ____The day of surgery in the morning    Date________    **Squirt ½ package of Bactroban Ointment onto a cotton applicator and apply to inside of 1st nostril.  Squirt the remaining Bactroban and apply to the inside of the other nostril.

## 2018-05-14 ENCOUNTER — OFFICE VISIT (OUTPATIENT)
Dept: ORTHOPEDIC SURGERY | Facility: CLINIC | Age: 63
End: 2018-05-14

## 2018-05-14 VITALS — WEIGHT: 180 LBS | BODY MASS INDEX: 28.25 KG/M2 | TEMPERATURE: 98.6 F | HEIGHT: 67 IN

## 2018-05-14 DIAGNOSIS — M25.511 CHRONIC RIGHT SHOULDER PAIN: Primary | ICD-10-CM

## 2018-05-14 DIAGNOSIS — G89.29 CHRONIC RIGHT SHOULDER PAIN: Primary | ICD-10-CM

## 2018-05-14 DIAGNOSIS — Z01.818 PRE-OP EVALUATION: ICD-10-CM

## 2018-05-14 PROCEDURE — 73030 X-RAY EXAM OF SHOULDER: CPT | Performed by: ORTHOPAEDIC SURGERY

## 2018-05-14 PROCEDURE — S0260 H&P FOR SURGERY: HCPCS | Performed by: ORTHOPAEDIC SURGERY

## 2018-05-15 NOTE — PROGRESS NOTES
History & Physical         Patient: Chandu Yost    YOB: 1955    Medical Record Number: [id number]    Chief Complaints:   Chief Complaint   Patient presents with   • Right Shoulder - Follow-up       History of Present Illness: 62 y.o. male who comes in today for his preoperative visit for the right shoulder.  The skin cancer from his shoulder has been removed and he has been cleared for surgery.  The shoulder continues to cause him severe constant pain.  He has trouble reaching and lifting and performing routine daily tasks including anything at or above shoulder level.  Describes his typical pain as severe, constant, and aching.    Allergies:   Allergies   Allergen Reactions   • Codeine Itching   • Latex Hives and Swelling       Medications:   Home Medications:    Current Outpatient Prescriptions:   •  albuterol (PROVENTIL HFA;VENTOLIN HFA) 108 (90 Base) MCG/ACT inhaler, Inhale 2 puffs Every 4 (Four) Hours As Needed for Wheezing., Disp: , Rfl:   •  ALBUTEROL SULFATE IN, Take  by nebulization 4 (Four) Times a Day As Needed., Disp: , Rfl:   •  ALPRAZolam (XANAX) 1 MG tablet, Take 1 mg by mouth 3 (Three) Times a Day As Needed., Disp: , Rfl:   •  aspirin 81 MG EC tablet, Take 81 mg by mouth Daily. PT HOLDING FOR SURGERY, Disp: , Rfl:   •  atenolol (TENORMIN) 50 MG tablet, Take 100 mg by mouth Every Morning., Disp: , Rfl: 0  •  calcium carbonate (TUMS ULTRA 1000) 1000 MG chewable tablet, Chew 1,000 mg As Needed for Indigestion or Heartburn., Disp: , Rfl:   •  Chlorhexidine Gluconate Cloth 2 % pads, Apply 1 application topically Take As Directed. USE AS DIRECTED PREOP , Disp: , Rfl:   •  cholecalciferol (VITAMIN D3) 1000 units tablet, Take 1,000 Units by mouth Daily., Disp: , Rfl:   •  cimetidine (TAGAMET) 800 MG tablet, Take 800 mg by mouth 2 (Two) Times a Day., Disp: , Rfl:   •  Cyanocobalamin (VITAMIN B-12 IJ), Inject  as directed Every 30 (Thirty) Days. In MD office, Disp: , Rfl:   •  Ferrous  Sulfate (IRON) 325 (65 Fe) MG tablet, Take 2 tablets by mouth Every Morning., Disp: , Rfl:   •  fluticasone (FLONASE) 50 MCG/ACT nasal spray, 2 sprays into each nostril Daily., Disp: , Rfl:   •  folic acid (FOLVITE) 400 MCG tablet, Take 800 mcg by mouth Every Morning., Disp: , Rfl:   •  gabapentin (NEURONTIN) 100 MG capsule, Take 100 mg by mouth At Night As Needed., Disp: , Rfl: 0  •  HYDROcodone-acetaminophen (NORCO)  MG per tablet, Take 1 tablet by mouth 4 (Four) Times a Day., Disp: , Rfl:   •  mupirocin (BACTROBAN) 2 % nasal ointment, 1 application into each nostril Take As Directed. USE AS DIRECTED PREOP , Disp: , Rfl:   •  niacin 500 MG tablet, Take 125 mg by mouth Every Morning., Disp: , Rfl:   •  tadalafil (CIALIS) 5 MG tablet, Take 5 mg by mouth Daily As Needed for erectile dysfunction. PT HOLDING FOR SURGERY, Disp: , Rfl:   •  triamcinolone (KENALOG) 0.1 % cream, Apply 1 application topically Daily As Needed., Disp: , Rfl:   •  vitamin C (ASCORBIC ACID) 500 MG tablet, Take 1,000 mg by mouth Daily., Disp: , Rfl:     Past Medical History:   Diagnosis Date   • Acid reflux disease    • Anemia    • Anxiety    • Arthritis    • Basal cell carcinoma     REMOVED RT SHOULDER AROUND MARCH 2018   • Chest pain     PT STATES OCCASIONAL   • COPD (chronic obstructive pulmonary disease)    • Deviated septum    • Dizziness    • Fatigue    • Headache    • High triglycerides    • History of gastric ulcer    • History of staph infection     2011 RLE   • Hypertension    • Iron deficiency    • Migraine    • Rash of back     THINKS FROM HEAT, PRN CREAM   • Rotator cuff tear     right   • Seasonal allergies    • Shoulder pain    • Stomach ulcer           Past Surgical History:   Procedure Laterality Date   • INGUINAL HERNIA REPAIR Right    • LUMBAR PUNCTURE     • ROTATOR CUFF REPAIR Left           Social History     Occupational History   • Not on file.     Social History Main Topics   • Smoking status: Former Smoker      Packs/day: 3.00     Years: 20.00     Types: Cigarettes     Quit date: 1985   • Smokeless tobacco: Former User     Types: Chew     Quit date: 1993      Comment: QUIT 32 YEARS AGO   • Alcohol use 25.2 - 28.8 oz/week     42 - 48 Cans of beer per week      Comment: 6-8 beers per day   • Drug use: No   • Sexual activity: Defer      Social History     Social History Narrative   • No narrative on file          Family History   Problem Relation Age of Onset   • Malig Hyperthermia Neg Hx        Review of Systems:     Constitutional:  Denies fever, shaking or chills   Eyes:  Denies change in visual acuity   HEENT:  Denies nasal congestion or sore throat   Respiratory:  Denies cough or shortness of breath   Cardiovascular:  Denies chest pain or edema   GI:  Denies abdominal pain, nausea, vomiting, bloody stools or diarrhea   Musculoskeletal:  Denies numbness tingling or loss of motor function  Integument:  Denies rash, lesion or ulceration   Neurologic:  Denies headache or focal weakness, deficits      All other pertinent positives and negatives as noted above in HPI.    Physical Exam: 62 y.o. male     General:  Patient is awake and alert.  Appears in no acute distress or discomfort.    Psych:  Affect and demeanor are appropriate.    Eyes:  Conjunctiva and sclera appear grossly normal.  Eyes track well and EOM seem to be intact.    Ears:  No gross abnormalities.  Hearing adequate for the exam.    Cardiovascular:  Regular rate and rhythm.    Lungs:  Good chest expansion.  Breathing unlabored.    Lymph:  No palpable adenopathy about neck or axilla.    Neck:  Supple.  Normal ROM.  Negative Spurling's for shoulder or arm pain.    Right upper extremity:  Skin benign and intact without evidence for swelling, masses or atrophy.  The previous skin lesion is now resolved and the wound from his excision is healed.  No palpable masses.  Moderate tenderness over the acromioclavicular joint with a positive active compression maneuver.   Shoulder ROM limited and uncomfortable--Forward elevation 85°, external rotation 25°, internal rotation to back pocket.  No evident instability or apprehension.  Hornblowers negative.  ER lag sign positive.  Good strength in deltoid, wrist and hand.  Intact sensation in arm, hand.  Palpable radial pulse with brisk cap refill.    Diagnostic Tests:  Lab Results   Component Value Date    GLUCOSE 89 05/08/2018    CALCIUM 8.8 05/08/2018     05/08/2018    K 4.0 05/08/2018    CO2 25.4 05/08/2018     05/08/2018    BUN 19 05/08/2018    CREATININE 1.15 05/08/2018    EGFRIFNONA 64 05/08/2018    BCR 16.5 05/08/2018    ANIONGAP 14.6 05/08/2018     Lab Results   Component Value Date    WBC 7.70 05/08/2018    HGB 12.3 (L) 05/08/2018    HCT 38.5 (L) 05/08/2018    MCV 98.7 (H) 05/08/2018     05/08/2018     No results found for: INR, PROTIME    Assessment:  Right shoulder rotator cuff tear arthropathy, symptomatic acromioclavicular arthritis    Plan: We had a thorough discussion regarding the risks, benefits and alternatives to an arthroplasty and non-surgical management versus surgery.  I explained that surgical risks include infection, hematoma, hardware related complications including failure of fixation, loosening, fracture, persistent pain and/or loss of motion, iatrogenic nerve and/or blood vessel injury resulting in permanent weakness, numbness or dysfunction, DVT, PE, positioning related neuropraxia, and anesthesia related complications resulting in death.  We discussed the indication for a reverse as opposed to a standard total shoulder and the risks inherent to the reverse including notching, glenoid loosening, instability, and traction related neuropraxia, any of which could result in persistent pain or problems requiring further surgery.  We discussed the distal clavicle excision and the fact that this will necessitate a separate incision.  Risks with this includes wound healing problems, infection and  persistent pain or instability which could necessitate further intervention in the future.  Lastly, we discussed the rehab and all that will be expected of the patient post operatively to ensure an optimal outcome.  The patient voiced understanding of the risks, benefits, and alternative forms of treatment that were discussed and the patient consents to proceed with the reverse arthroplasty and open distal clavicle excision.      Date: 5/15/2018    Rafa Correa MD

## 2018-05-17 ENCOUNTER — HOSPITAL ENCOUNTER (INPATIENT)
Facility: HOSPITAL | Age: 63
LOS: 1 days | Discharge: HOME | End: 2018-05-18
Attending: ORTHOPAEDIC SURGERY | Admitting: ORTHOPAEDIC SURGERY

## 2018-05-17 ENCOUNTER — ANESTHESIA EVENT (OUTPATIENT)
Dept: PERIOP | Facility: HOSPITAL | Age: 63
End: 2018-05-17

## 2018-05-17 ENCOUNTER — ANESTHESIA (OUTPATIENT)
Dept: PERIOP | Facility: HOSPITAL | Age: 63
End: 2018-05-17

## 2018-05-17 ENCOUNTER — APPOINTMENT (OUTPATIENT)
Dept: GENERAL RADIOLOGY | Facility: HOSPITAL | Age: 63
End: 2018-05-17

## 2018-05-17 DIAGNOSIS — M75.101 ROTATOR CUFF TEAR ARTHROPATHY OF RIGHT SHOULDER: ICD-10-CM

## 2018-05-17 DIAGNOSIS — M12.811 ROTATOR CUFF TEAR ARTHROPATHY OF RIGHT SHOULDER: ICD-10-CM

## 2018-05-17 PROCEDURE — 23472 RECONSTRUCT SHOULDER JOINT: CPT | Performed by: ORTHOPAEDIC SURGERY

## 2018-05-17 PROCEDURE — 25010000002 VANCOMYCIN PER 500 MG: Performed by: NURSE ANESTHETIST, CERTIFIED REGISTERED

## 2018-05-17 PROCEDURE — 25010000002 HYDRALAZINE PER 20 MG: Performed by: NURSE ANESTHETIST, CERTIFIED REGISTERED

## 2018-05-17 PROCEDURE — C1776 JOINT DEVICE (IMPLANTABLE): HCPCS | Performed by: ORTHOPAEDIC SURGERY

## 2018-05-17 PROCEDURE — 25010000003 CEFAZOLIN IN DEXTROSE 2-4 GM/100ML-% SOLUTION: Performed by: ORTHOPAEDIC SURGERY

## 2018-05-17 PROCEDURE — 25010000002 MIDAZOLAM PER 1 MG: Performed by: ANESTHESIOLOGY

## 2018-05-17 PROCEDURE — 0RRJ00Z REPLACEMENT OF RIGHT SHOULDER JOINT WITH REVERSE BALL AND SOCKET SYNTHETIC SUBSTITUTE, OPEN APPROACH: ICD-10-PCS | Performed by: ORTHOPAEDIC SURGERY

## 2018-05-17 PROCEDURE — 25010000002 ONDANSETRON PER 1 MG: Performed by: NURSE ANESTHETIST, CERTIFIED REGISTERED

## 2018-05-17 PROCEDURE — 0LS10ZZ REPOSITION RIGHT SHOULDER TENDON, OPEN APPROACH: ICD-10-PCS | Performed by: ORTHOPAEDIC SURGERY

## 2018-05-17 PROCEDURE — 25010000002 FENTANYL CITRATE (PF) 100 MCG/2ML SOLUTION: Performed by: NURSE ANESTHETIST, CERTIFIED REGISTERED

## 2018-05-17 PROCEDURE — 23120 CLAVICULECTOMY PARTIAL: CPT | Performed by: ORTHOPAEDIC SURGERY

## 2018-05-17 PROCEDURE — 25010000002 DEXAMETHASONE PER 1 MG: Performed by: NURSE ANESTHETIST, CERTIFIED REGISTERED

## 2018-05-17 PROCEDURE — 73020 X-RAY EXAM OF SHOULDER: CPT

## 2018-05-17 PROCEDURE — 25010000002 FENTANYL CITRATE (PF) 100 MCG/2ML SOLUTION

## 2018-05-17 PROCEDURE — 25010000002 PROPOFOL 10 MG/ML EMULSION: Performed by: NURSE ANESTHETIST, CERTIFIED REGISTERED

## 2018-05-17 PROCEDURE — 25010000002 FENTANYL CITRATE (PF) 100 MCG/2ML SOLUTION: Performed by: ANESTHESIOLOGY

## 2018-05-17 DEVICE — IMPLANTABLE DEVICE: Type: IMPLANTABLE DEVICE | Site: SHOULDER | Status: FUNCTIONAL

## 2018-05-17 DEVICE — SCRW FIX LK HEX 4.75X15MM: Type: IMPLANTABLE DEVICE | Site: SHOULDER | Status: FUNCTIONAL

## 2018-05-17 DEVICE — BASEPLT GLEN COMPR MINI W TPR ADAPTR 25: Type: IMPLANTABLE DEVICE | Site: SHOULDER | Status: FUNCTIONAL

## 2018-05-17 DEVICE — TRY HUM STD COBLT 44MM: Type: IMPLANTABLE DEVICE | Site: SHOULDER | Status: FUNCTIONAL

## 2018-05-17 DEVICE — IMPLANTABLE DEVICE
Type: IMPLANTABLE DEVICE | Site: SHOULDER | Status: FUNCTIONAL
Brand: COMPREHENSIVE® REVERSE SHOULDER

## 2018-05-17 DEVICE — IMPLANTABLE DEVICE
Type: IMPLANTABLE DEVICE | Site: SHOULDER | Status: FUNCTIONAL
Brand: COMPREHENSIVE SHOULDER SYSTEM

## 2018-05-17 DEVICE — SCRW COMPRNSV CNTRL 6.5X35MM REUS: Type: IMPLANTABLE DEVICE | Site: SHOULDER | Status: FUNCTIONAL

## 2018-05-17 DEVICE — IMPLANTABLE DEVICE
Type: IMPLANTABLE DEVICE | Site: SHOULDER | Status: FUNCTIONAL
Brand: COMPREHENSIVE REVERSE SHOULDER

## 2018-05-17 DEVICE — SCRW FIX LK HEX 4.75X25MM: Type: IMPLANTABLE DEVICE | Site: SHOULDER | Status: FUNCTIONAL

## 2018-05-17 RX ORDER — DOCUSATE SODIUM 100 MG/1
100 CAPSULE, LIQUID FILLED ORAL 2 TIMES DAILY PRN
Status: DISCONTINUED | OUTPATIENT
Start: 2018-05-17 | End: 2018-05-18 | Stop reason: HOSPADM

## 2018-05-17 RX ORDER — SODIUM CHLORIDE 0.9 % (FLUSH) 0.9 %
1-10 SYRINGE (ML) INJECTION AS NEEDED
Status: DISCONTINUED | OUTPATIENT
Start: 2018-05-17 | End: 2018-05-17 | Stop reason: HOSPADM

## 2018-05-17 RX ORDER — SODIUM CHLORIDE 9 MG/ML
100 INJECTION, SOLUTION INTRAVENOUS CONTINUOUS
Status: DISCONTINUED | OUTPATIENT
Start: 2018-05-17 | End: 2018-05-18 | Stop reason: HOSPADM

## 2018-05-17 RX ORDER — LIDOCAINE HYDROCHLORIDE 10 MG/ML
0.5 INJECTION, SOLUTION EPIDURAL; INFILTRATION; INTRACAUDAL; PERINEURAL ONCE AS NEEDED
Status: DISCONTINUED | OUTPATIENT
Start: 2018-05-17 | End: 2018-05-17 | Stop reason: HOSPADM

## 2018-05-17 RX ORDER — ATENOLOL 50 MG/1
100 TABLET ORAL EVERY MORNING
Status: DISCONTINUED | OUTPATIENT
Start: 2018-05-18 | End: 2018-05-18 | Stop reason: HOSPADM

## 2018-05-17 RX ORDER — CEFAZOLIN SODIUM 2 G/100ML
2 INJECTION, SOLUTION INTRAVENOUS ONCE
Status: COMPLETED | OUTPATIENT
Start: 2018-05-17 | End: 2018-05-17

## 2018-05-17 RX ORDER — SODIUM CHLORIDE, SODIUM LACTATE, POTASSIUM CHLORIDE, CALCIUM CHLORIDE 600; 310; 30; 20 MG/100ML; MG/100ML; MG/100ML; MG/100ML
9 INJECTION, SOLUTION INTRAVENOUS CONTINUOUS
Status: DISCONTINUED | OUTPATIENT
Start: 2018-05-17 | End: 2018-05-17 | Stop reason: HOSPADM

## 2018-05-17 RX ORDER — PROMETHAZINE HYDROCHLORIDE 25 MG/ML
12.5 INJECTION, SOLUTION INTRAMUSCULAR; INTRAVENOUS ONCE AS NEEDED
Status: DISCONTINUED | OUTPATIENT
Start: 2018-05-17 | End: 2018-05-17 | Stop reason: HOSPADM

## 2018-05-17 RX ORDER — NALOXONE HCL 0.4 MG/ML
0.2 VIAL (ML) INJECTION AS NEEDED
Status: DISCONTINUED | OUTPATIENT
Start: 2018-05-17 | End: 2018-05-17 | Stop reason: HOSPADM

## 2018-05-17 RX ORDER — PROMETHAZINE HYDROCHLORIDE 25 MG/1
25 SUPPOSITORY RECTAL ONCE AS NEEDED
Status: DISCONTINUED | OUTPATIENT
Start: 2018-05-17 | End: 2018-05-17 | Stop reason: HOSPADM

## 2018-05-17 RX ORDER — PROMETHAZINE HYDROCHLORIDE 25 MG/1
25 TABLET ORAL ONCE AS NEEDED
Status: DISCONTINUED | OUTPATIENT
Start: 2018-05-17 | End: 2018-05-17 | Stop reason: HOSPADM

## 2018-05-17 RX ORDER — FENTANYL CITRATE 50 UG/ML
50 INJECTION, SOLUTION INTRAMUSCULAR; INTRAVENOUS
Status: DISCONTINUED | OUTPATIENT
Start: 2018-05-17 | End: 2018-05-17 | Stop reason: HOSPADM

## 2018-05-17 RX ORDER — LABETALOL HYDROCHLORIDE 5 MG/ML
5 INJECTION, SOLUTION INTRAVENOUS
Status: DISCONTINUED | OUTPATIENT
Start: 2018-05-17 | End: 2018-05-17 | Stop reason: HOSPADM

## 2018-05-17 RX ORDER — FAMOTIDINE 20 MG/1
10 TABLET, FILM COATED ORAL DAILY
Status: DISCONTINUED | OUTPATIENT
Start: 2018-05-17 | End: 2018-05-18 | Stop reason: HOSPADM

## 2018-05-17 RX ORDER — GABAPENTIN 100 MG/1
100 CAPSULE ORAL NIGHTLY
Status: DISCONTINUED | OUTPATIENT
Start: 2018-05-17 | End: 2018-05-18 | Stop reason: HOSPADM

## 2018-05-17 RX ORDER — PROPOFOL 10 MG/ML
VIAL (ML) INTRAVENOUS AS NEEDED
Status: DISCONTINUED | OUTPATIENT
Start: 2018-05-17 | End: 2018-05-17 | Stop reason: SURG

## 2018-05-17 RX ORDER — CEFAZOLIN SODIUM 2 G/100ML
2 INJECTION, SOLUTION INTRAVENOUS EVERY 8 HOURS
Status: COMPLETED | OUTPATIENT
Start: 2018-05-17 | End: 2018-05-18

## 2018-05-17 RX ORDER — TRANEXAMIC ACID 100 MG/ML
INJECTION, SOLUTION INTRAVENOUS AS NEEDED
Status: DISCONTINUED | OUTPATIENT
Start: 2018-05-17 | End: 2018-05-17 | Stop reason: SURG

## 2018-05-17 RX ORDER — DIPHENHYDRAMINE HYDROCHLORIDE 50 MG/ML
12.5 INJECTION INTRAMUSCULAR; INTRAVENOUS
Status: DISCONTINUED | OUTPATIENT
Start: 2018-05-17 | End: 2018-05-17 | Stop reason: HOSPADM

## 2018-05-17 RX ORDER — MIDAZOLAM HYDROCHLORIDE 1 MG/ML
1 INJECTION INTRAMUSCULAR; INTRAVENOUS
Status: DISCONTINUED | OUTPATIENT
Start: 2018-05-17 | End: 2018-05-17 | Stop reason: HOSPADM

## 2018-05-17 RX ORDER — ACETAMINOPHEN 325 MG/1
325 TABLET ORAL EVERY 4 HOURS PRN
Status: DISCONTINUED | OUTPATIENT
Start: 2018-05-17 | End: 2018-05-18 | Stop reason: HOSPADM

## 2018-05-17 RX ORDER — HYDRALAZINE HYDROCHLORIDE 20 MG/ML
5 INJECTION INTRAMUSCULAR; INTRAVENOUS
Status: DISCONTINUED | OUTPATIENT
Start: 2018-05-17 | End: 2018-05-17 | Stop reason: HOSPADM

## 2018-05-17 RX ORDER — ONDANSETRON 4 MG/1
4 TABLET, FILM COATED ORAL EVERY 6 HOURS PRN
Status: DISCONTINUED | OUTPATIENT
Start: 2018-05-17 | End: 2018-05-18 | Stop reason: HOSPADM

## 2018-05-17 RX ORDER — FAMOTIDINE 10 MG/ML
20 INJECTION, SOLUTION INTRAVENOUS ONCE
Status: COMPLETED | OUTPATIENT
Start: 2018-05-17 | End: 2018-05-17

## 2018-05-17 RX ORDER — ONDANSETRON 2 MG/ML
4 INJECTION INTRAMUSCULAR; INTRAVENOUS EVERY 6 HOURS PRN
Status: DISCONTINUED | OUTPATIENT
Start: 2018-05-17 | End: 2018-05-18 | Stop reason: HOSPADM

## 2018-05-17 RX ORDER — BUPIVACAINE HYDROCHLORIDE 2.5 MG/ML
INJECTION, SOLUTION EPIDURAL; INFILTRATION; INTRACAUDAL AS NEEDED
Status: DISCONTINUED | OUTPATIENT
Start: 2018-05-17 | End: 2018-05-17 | Stop reason: SURG

## 2018-05-17 RX ORDER — BISACODYL 10 MG
10 SUPPOSITORY, RECTAL RECTAL DAILY PRN
Status: DISCONTINUED | OUTPATIENT
Start: 2018-05-17 | End: 2018-05-18 | Stop reason: HOSPADM

## 2018-05-17 RX ORDER — ALBUTEROL SULFATE 2.5 MG/3ML
2.5 SOLUTION RESPIRATORY (INHALATION) EVERY 6 HOURS PRN
Status: DISCONTINUED | OUTPATIENT
Start: 2018-05-17 | End: 2018-05-18 | Stop reason: HOSPADM

## 2018-05-17 RX ORDER — FLUMAZENIL 0.1 MG/ML
0.2 INJECTION INTRAVENOUS AS NEEDED
Status: DISCONTINUED | OUTPATIENT
Start: 2018-05-17 | End: 2018-05-17 | Stop reason: HOSPADM

## 2018-05-17 RX ORDER — DEXAMETHASONE SODIUM PHOSPHATE 10 MG/ML
INJECTION INTRAMUSCULAR; INTRAVENOUS AS NEEDED
Status: DISCONTINUED | OUTPATIENT
Start: 2018-05-17 | End: 2018-05-17 | Stop reason: SURG

## 2018-05-17 RX ORDER — MAGNESIUM HYDROXIDE 1200 MG/15ML
LIQUID ORAL AS NEEDED
Status: DISCONTINUED | OUTPATIENT
Start: 2018-05-17 | End: 2018-05-17 | Stop reason: HOSPADM

## 2018-05-17 RX ORDER — ONDANSETRON 2 MG/ML
4 INJECTION INTRAMUSCULAR; INTRAVENOUS ONCE AS NEEDED
Status: DISCONTINUED | OUTPATIENT
Start: 2018-05-17 | End: 2018-05-17 | Stop reason: HOSPADM

## 2018-05-17 RX ORDER — ALPRAZOLAM 0.5 MG/1
1 TABLET ORAL 3 TIMES DAILY PRN
Status: DISCONTINUED | OUTPATIENT
Start: 2018-05-17 | End: 2018-05-18 | Stop reason: HOSPADM

## 2018-05-17 RX ORDER — MIDAZOLAM HYDROCHLORIDE 1 MG/ML
2 INJECTION INTRAMUSCULAR; INTRAVENOUS
Status: DISCONTINUED | OUTPATIENT
Start: 2018-05-17 | End: 2018-05-17 | Stop reason: HOSPADM

## 2018-05-17 RX ORDER — ONDANSETRON 4 MG/1
4 TABLET, ORALLY DISINTEGRATING ORAL EVERY 6 HOURS PRN
Status: DISCONTINUED | OUTPATIENT
Start: 2018-05-17 | End: 2018-05-18 | Stop reason: HOSPADM

## 2018-05-17 RX ORDER — OXYCODONE AND ACETAMINOPHEN 10; 325 MG/1; MG/1
2 TABLET ORAL EVERY 4 HOURS PRN
Status: DISCONTINUED | OUTPATIENT
Start: 2018-05-17 | End: 2018-05-18 | Stop reason: HOSPADM

## 2018-05-17 RX ORDER — FENTANYL CITRATE 50 UG/ML
INJECTION, SOLUTION INTRAMUSCULAR; INTRAVENOUS
Status: COMPLETED
Start: 2018-05-17 | End: 2018-05-17

## 2018-05-17 RX ORDER — ONDANSETRON 2 MG/ML
INJECTION INTRAMUSCULAR; INTRAVENOUS AS NEEDED
Status: DISCONTINUED | OUTPATIENT
Start: 2018-05-17 | End: 2018-05-17 | Stop reason: SURG

## 2018-05-17 RX ORDER — EPHEDRINE SULFATE 50 MG/ML
INJECTION, SOLUTION INTRAVENOUS AS NEEDED
Status: DISCONTINUED | OUTPATIENT
Start: 2018-05-17 | End: 2018-05-17 | Stop reason: SURG

## 2018-05-17 RX ORDER — OXYCODONE AND ACETAMINOPHEN 7.5; 325 MG/1; MG/1
1 TABLET ORAL ONCE AS NEEDED
Status: DISCONTINUED | OUTPATIENT
Start: 2018-05-17 | End: 2018-05-17 | Stop reason: HOSPADM

## 2018-05-17 RX ORDER — PROMETHAZINE HYDROCHLORIDE 25 MG/1
12.5 TABLET ORAL ONCE AS NEEDED
Status: DISCONTINUED | OUTPATIENT
Start: 2018-05-17 | End: 2018-05-17 | Stop reason: HOSPADM

## 2018-05-17 RX ORDER — LIDOCAINE HYDROCHLORIDE 20 MG/ML
INJECTION, SOLUTION INFILTRATION; PERINEURAL AS NEEDED
Status: DISCONTINUED | OUTPATIENT
Start: 2018-05-17 | End: 2018-05-17 | Stop reason: SURG

## 2018-05-17 RX ORDER — ALBUTEROL SULFATE 90 UG/1
2 AEROSOL, METERED RESPIRATORY (INHALATION) EVERY 4 HOURS PRN
Status: DISCONTINUED | OUTPATIENT
Start: 2018-05-17 | End: 2018-05-17 | Stop reason: SDUPTHER

## 2018-05-17 RX ORDER — EPHEDRINE SULFATE 50 MG/ML
5 INJECTION, SOLUTION INTRAVENOUS ONCE AS NEEDED
Status: DISCONTINUED | OUTPATIENT
Start: 2018-05-17 | End: 2018-05-17 | Stop reason: HOSPADM

## 2018-05-17 RX ORDER — HYDROMORPHONE HCL 110MG/55ML
0.5 PATIENT CONTROLLED ANALGESIA SYRINGE INTRAVENOUS
Status: DISCONTINUED | OUTPATIENT
Start: 2018-05-17 | End: 2018-05-17 | Stop reason: HOSPADM

## 2018-05-17 RX ORDER — HYDROMORPHONE HYDROCHLORIDE 1 MG/ML
0.5 INJECTION, SOLUTION INTRAMUSCULAR; INTRAVENOUS; SUBCUTANEOUS
Status: DISCONTINUED | OUTPATIENT
Start: 2018-05-17 | End: 2018-05-18 | Stop reason: HOSPADM

## 2018-05-17 RX ORDER — HYDROCODONE BITARTRATE AND ACETAMINOPHEN 7.5; 325 MG/1; MG/1
1 TABLET ORAL ONCE AS NEEDED
Status: DISCONTINUED | OUTPATIENT
Start: 2018-05-17 | End: 2018-05-17 | Stop reason: HOSPADM

## 2018-05-17 RX ORDER — NALOXONE HCL 0.4 MG/ML
0.1 VIAL (ML) INJECTION
Status: DISCONTINUED | OUTPATIENT
Start: 2018-05-17 | End: 2018-05-18 | Stop reason: HOSPADM

## 2018-05-17 RX ADMIN — ONDANSETRON 4 MG: 2 INJECTION INTRAMUSCULAR; INTRAVENOUS at 12:40

## 2018-05-17 RX ADMIN — OXYCODONE HYDROCHLORIDE AND ACETAMINOPHEN 2 TABLET: 10; 325 TABLET ORAL at 21:04

## 2018-05-17 RX ADMIN — FAMOTIDINE 10 MG: 20 TABLET, FILM COATED ORAL at 18:16

## 2018-05-17 RX ADMIN — FENTANYL CITRATE 50 MCG: 50 INJECTION INTRAMUSCULAR; INTRAVENOUS at 13:27

## 2018-05-17 RX ADMIN — EPHEDRINE SULFATE 10 MG: 50 INJECTION INTRAMUSCULAR; INTRAVENOUS; SUBCUTANEOUS at 11:45

## 2018-05-17 RX ADMIN — GABAPENTIN 100 MG: 100 CAPSULE ORAL at 21:04

## 2018-05-17 RX ADMIN — BUPIVACAINE 10 ML: 13.3 INJECTION, SUSPENSION, LIPOSOMAL INFILTRATION at 10:26

## 2018-05-17 RX ADMIN — SODIUM CHLORIDE 100 ML/HR: 9 INJECTION, SOLUTION INTRAVENOUS at 17:08

## 2018-05-17 RX ADMIN — LIDOCAINE HYDROCHLORIDE 60 MG: 20 INJECTION, SOLUTION INFILTRATION; PERINEURAL at 11:17

## 2018-05-17 RX ADMIN — VANCOMYCIN HYDROCHLORIDE 1 G: 1 INJECTION, POWDER, LYOPHILIZED, FOR SOLUTION INTRAVENOUS at 11:59

## 2018-05-17 RX ADMIN — FENTANYL CITRATE 50 MCG: 50 INJECTION INTRAMUSCULAR; INTRAVENOUS at 10:30

## 2018-05-17 RX ADMIN — SODIUM CHLORIDE, POTASSIUM CHLORIDE, SODIUM LACTATE AND CALCIUM CHLORIDE 9 ML/HR: 600; 310; 30; 20 INJECTION, SOLUTION INTRAVENOUS at 10:33

## 2018-05-17 RX ADMIN — DEXAMETHASONE SODIUM PHOSPHATE 8 MG: 10 INJECTION INTRAMUSCULAR; INTRAVENOUS at 11:45

## 2018-05-17 RX ADMIN — TRANEXAMIC ACID 1000 MG: 100 INJECTION, SOLUTION INTRAVENOUS at 11:45

## 2018-05-17 RX ADMIN — TRANEXAMIC ACID 1000 MG: 100 INJECTION, SOLUTION INTRAVENOUS at 12:40

## 2018-05-17 RX ADMIN — CEFAZOLIN SODIUM 2 G: 2 INJECTION, SOLUTION INTRAVENOUS at 11:19

## 2018-05-17 RX ADMIN — FAMOTIDINE 20 MG: 10 INJECTION, SOLUTION INTRAVENOUS at 10:29

## 2018-05-17 RX ADMIN — BUPIVACAINE HYDROCHLORIDE 5 ML: 2.5 INJECTION, SOLUTION EPIDURAL; INFILTRATION; INTRACAUDAL; PERINEURAL at 10:25

## 2018-05-17 RX ADMIN — MIDAZOLAM HYDROCHLORIDE 2 MG: 2 INJECTION, SOLUTION INTRAMUSCULAR; INTRAVENOUS at 10:29

## 2018-05-17 RX ADMIN — Medication 5 MG: at 13:36

## 2018-05-17 RX ADMIN — PROPOFOL 200 MG: 10 INJECTION, EMULSION INTRAVENOUS at 11:17

## 2018-05-17 RX ADMIN — FENTANYL CITRATE 50 MCG: 50 INJECTION INTRAMUSCULAR; INTRAVENOUS at 10:29

## 2018-05-17 RX ADMIN — FENTANYL CITRATE 50 MCG: 50 INJECTION INTRAMUSCULAR; INTRAVENOUS at 13:43

## 2018-05-17 RX ADMIN — OXYCODONE HYDROCHLORIDE AND ACETAMINOPHEN 2 TABLET: 10; 325 TABLET ORAL at 17:07

## 2018-05-17 RX ADMIN — CEFAZOLIN SODIUM 2 G: 2 INJECTION, SOLUTION INTRAVENOUS at 18:16

## 2018-05-17 NOTE — BRIEF OP NOTE
TOTAL SHOULDER REVERSE ARTHROPLASTY  Progress Note    Chandu Yost  5/17/2018    Pre-op Diagnosis:   Rotator cuff tear arthropathy of right shoulder [M12.811]       Post-Op Diagnosis Codes:     * Rotator cuff tear arthropathy of right shoulder [M12.811]    Procedure/CPT® Codes:      Procedure(s):  Reverse Total Shoulder Arthroplasty, biceps tenodesis, Distal Clavicle Excision    Surgeon(s):  Rafa Correa MD    Anesthesia: General with Block    Staff:   Circulator: Colleen Ponce RN; America García RN  Scrub Person: Mag Daniels; Mackenzie Maurice  Vendor Representative: Issac Lazo  Assistant: Ajith Manriquez CSA    Estimated Blood Loss: 200ml    Urine Voided: * No values recorded between 5/17/2018 11:05 AM and 5/17/2018 12:49 PM *    Specimens:                None      Drains:   Closed/Suction Drain Right Shoulder Accordion 10 Fr. (Active)       Findings: see dictation    Complications: none      Rafa Correa MD     Date: 5/17/2018  Time: 12:49 PM

## 2018-05-17 NOTE — ANESTHESIA PREPROCEDURE EVALUATION
Anesthesia Evaluation     Patient summary reviewed and Nursing notes reviewed   NPO Solid Status: > 8 hours  NPO Liquid Status: > 2 hours           Airway   Mallampati: II  no difficulty expected  Dental - normal exam   (+) poor dentition and upper dentures    Pulmonary     breath sounds clear to auscultation  (+) a smoker Former, COPD, decreased breath sounds,   Cardiovascular     ECG reviewed  Rhythm: regular  Rate: normal    (+) hypertension, hyperlipidemia,       Neuro/Psych  (+) headaches, dizziness/light headedness,     GI/Hepatic/Renal/Endo    (+)  GERD,      Musculoskeletal     Abdominal    Substance History      OB/GYN          Other   (+) arthritis   history of cancer                    Anesthesia Plan    ASA 3     general and regional     intravenous and inhalational induction   Anesthetic plan and risks discussed with patient.

## 2018-05-17 NOTE — OP NOTE
Orthopaedic Operative Note    Facility: [facility]    Patient: Chandu Novakmfield    Medical Record Number: [id number]    YOB: 1955    Dictating Surgeon: Rafa Correa M.D.    Primary Care Physician: FIDENCIO Reynolds    Date of Operation:  5/17/2018    PREOPERATIVE DIAGNOSIS:  Right shoulder rotator cuff tear arthropathy, symptomatic acromioclavicular arthritis         POSTOPERATIVE DIAGNOSIS: Right shoulder rotator cuff tear arthropathy, symptomatic acromioclavicular arthritis         PROCEDURES PERFORMED:     1.  Right reverse total shoulder arthroplasty     2. Tenodesis of the long head of the biceps tendon.     3.  Open distal clavicle excision      SURGEON: Rafa Correa MD      ASSISTANT: Ajith Manriquez ([facility])      ANESTHESIA:    Regional followed by General.          COMPLICATIONS: None.       SPECIMEN: None.       ESTIMATED BLOOD LOSS: Less than 200 mL.       IMPLANTS:    1. Biomet 11 mm mini comprehensive humeral stem with 44 standard tray and 36 standard   humeral bearing.    2. Size 25 mm mini glenoid baseplate with one 6.5 mm central compression screw   and 4 peripheral 4.75 mm locking screws.    3. Size 36 mm Glenosphere.      INDICATIONS: The patient has a long history of shoulder pain and weakness   which has been nonresponse to conservative treatment.  I explained that surgical risks include infection, hematoma, hardware related complications including failure of fixation, loosening, fracture, persistent pain and/or loss of motion, iatrogenic nerve and/or blood vessel injury resulting in permanent weakness, numbness or dysfunction, DVT, PE, positioning related neuropraxia, and anesthesia related complications resulting in death.  We discussed the indication for a reverse as opposed to a standard total shoulder and the risks inherent to the reverse including notching, glenoid loosening, instability, and traction related neuropraxia, any of which could result  in persistent pain or problems requiring further surgery.                        DESCRIPTION OF PROCEDURE: The patient and operative site were identified in the   preoperative holding area. The surgical site was marked. Following this, adequate   regional anesthesia of the right upper extremity was administered. The patient   was then taken to the operating room and placed in the supine position.   Adequate general anesthesia was administered and then the patient was repositioned on   the operating table in the modified beach chair position. The head and neck were   placed in neutral alignment and all bony prominences were carefully padded and   protected. Once we had appropriate position, a timeout was taken, preoperative   antibiotics were administered. The extremity was cleaned with an alcohol   solution. A Hibiclens scrub was performed and then the extremity was prepped   with 2 ChloraPreps. I allowed this to dry for 3 minutes before the draping   procedure was carried out. Again, a timeout had been taken and preoperative   antibiotics administered prior to incision.     I fashioned an approximately 6 cm incision over the anterior aspect of the shoulder,   carried that down through the skin and subcutaneous tissues. Full-thickness medial and   lateral skin flaps were developed. The cephalic vein was dissected out and this structure   was retracted laterally. This was kept protected throughout the duration of the   case. The underlying clavipectoral fascia was divided. The deltoid muscle was   retracted laterally and the strap muscles retracted medially. The long head of   the biceps was dissected out of the groove. This was released off of its   attachment to the supraglenoid tubercle. The diseased intra-articular portion   of the biceps was removed and then the long head of the biceps was tenodesed to   the pectoralis tendon using multiple MaxBraid sutures.        The patient had a large, retracted rotator cuff  tear involving the supraspinatus and infraspinatus along with the upper subscapularis.  There was also significant arthrosis of the visible   portions of the humeral head.       The remaining subscapularis was released off the attachment to   the lesser tuberosity and the humeral head exposed. The periarticular   osteophytes were carefully removed with a rongeur. I then inserted the cutting   guide. This was pinned into position, set to 20 degrees of retroversion as referenced   off the forearm. The head cut was carried out in typical fashion and the cut   portion of bone removed.     Once I completed that process, I directed my attention   to the glenoid. The axillary nerve was dissected out and this structure was   identified and protected. Once I had that structure protected, the anterior,   posterior, and inferior glenoid retractors were carefully positioned. Superior,   anterior, and inferior glenoid labral tissue was carefully removed to allow for   exposure of the caudal rim of the glenoid to ensure correct positioning of the   baseplate.     The centering guide for the baseplate was inserted, the center pin   was drilled. The glenoid was then reamed and prepared in typical fashion,   careful to maintain appropriate inferior tilt of the component. With the   glenoid sided preparations completed, the baseplate was impacted into position.   It seated well. It was secured with a single central compression screw along   with the 4 peripheral locking screws. The compression screw got great purchase   and the 4 locking screws were confirmed to lock very nicely into the plate. The   baseplate seemed to be well fixed.     I trialed with a 36 Glenosphere. It fit well.   I did have to adjust the offset to allow for adequate   inferior overhang to hopefully minimize the risk of notching. The trial   component was removed, final component impacted into position. It seated well.   I confirmed that was well seated by  pulling up on it circumferentially with a   right angle clamp. When doing so, the entire scapula seemed to move. With the   baseplate and glenoid well seated, I then directed my attention to the humerus.     The humerus was reamed and broached up to an 11 which fit well. The trial   component was removed, final component impacted into position, taking care to   maintain appropriate retroversion as referenced off the forearm. I trialed off   of the stem then placed the final implants. The 36 standard fit the best and restored excellent    motion and stability. There was no impingement and I did check the stability in multiple   positions of internal and external rotation with axial loading.  Once again,    the components were confirmed to be stable, and again, the shoulder   demonstrated excellent motion.     Next, I directed my attention to the distal clavicle excision.  The acromioclavicular joint was exposed through a 3 cm incision over the top of the shoulder.  I preserved the capsule for an anatomic repair.  There was extensive arthropathy and spurring off of the inferior portion of the clavicle.  The distal clavicle was exposed.  I removed approximately 1 cm of bone from the distal clavicle under direct visualization using an oscillating saw.  Once I had completed the distal clavicle excision, I confirmed that there were no amputated fragments or remaining areas that warranted debridement.  I confirmed that this space was completely decompressed.        The wounds were irrigated out with 500 ccs of a betadine-containing   saline solution. I then irrigated with 3 L of sterile saline mixed with bacitracin via    pulsatile lavage. I made sure that we had good hemostasis. The wounds were then   sequentially closed in layers using interrupted Vicryl for the deep tissues and a running   subcuticular Monocryl stitch for the skin followed by Dermabond. Sterile   dressings were applied to the wounds and the drapes  withdrawn. The arm was   placed in a shoulder immobilizer. The patient was awakened, transferred to a   standard bed, and taken to the recovery room. The patient tolerated the procedure well.   There were no complications. The patient was taken to the recovery room in good   condition.          Rafa Correa M.D.*   5/17/2018     cc: FIDENCIO Reynolds

## 2018-05-17 NOTE — ANESTHESIA PROCEDURE NOTES
Peripheral Block    Patient location during procedure: holding area  Start time: 5/17/2018 10:25 AM  Stop time: 5/17/2018 10:35 AM  Reason for block: at surgeon's request and post-op pain management  Performed by  Anesthesiologist: ANA MARÍA DOMINGO  Preanesthetic Checklist  Completed: patient identified, site marked, surgical consent, pre-op evaluation, timeout performed, IV checked, risks and benefits discussed and monitors and equipment checked  Prep:  Pt Position: sitting  Sterile barriers:cap, gloves, mask and sterile barriers  Prep: ChloraPrep  Patient monitoring: blood pressure monitoring, continuous pulse oximetry and EKG  Procedure  Sedation:yes  Performed under: MAC  Guidance:ultrasound guided  ULTRASOUND INTERPRETATION. Using ultrasound guidance a 20 G gauge needle was placed in close proximity to the nerve, at which point, under ultrasound guidance anesthetic was injected in the area of the nerve and spread of the anesthesia was seen on ultrasound in close proximity thereto.  There were no abnormalities seen on ultrasound; a digital image was taken; and the patient tolerated the procedure with no complications. Images:still images obtained    Laterality:right  Block Type:interscalene  Injection Technique:single-shot  Needle Type:echogenic  Needle Gauge:20 G  Resistance on Injection: none  Medications  Depomedrol:80  Comment:Exparel 10cc  Local Injected:bupivacaine 0.25% Local Amount Injected:15mL  Post Assessment  Injection Assessment: negative aspiration for heme, no paresthesia on injection and incremental injection  Patient Tolerance:comfortable throughout block  Complications:no

## 2018-05-17 NOTE — ANESTHESIA PROCEDURE NOTES
Airway  Urgency: elective    Airway not difficult    General Information and Staff    Patient location during procedure: OR  Anesthesiologist: TRI HERNANDEZ  CRNA: SONJA OSBORNE    Indications and Patient Condition  Indications for airway management: airway protection    Preoxygenated: yes  MILS not maintained throughout  Mask difficulty assessment: 1 - vent by mask    Final Airway Details  Final airway type: supraglottic airway      Successful airway: classic  Size 4    Number of attempts at approach: 1    Additional Comments  Inflated to seal.

## 2018-05-17 NOTE — H&P (VIEW-ONLY)
History & Physical         Patient: Chandu Yost    YOB: 1955    Medical Record Number: [id number]    Chief Complaints:   Chief Complaint   Patient presents with   • Right Shoulder - Follow-up       History of Present Illness: 62 y.o. male who comes in today for his preoperative visit for the right shoulder.  The skin cancer from his shoulder has been removed and he has been cleared for surgery.  The shoulder continues to cause him severe constant pain.  He has trouble reaching and lifting and performing routine daily tasks including anything at or above shoulder level.  Describes his typical pain as severe, constant, and aching.    Allergies:   Allergies   Allergen Reactions   • Codeine Itching   • Latex Hives and Swelling       Medications:   Home Medications:    Current Outpatient Prescriptions:   •  albuterol (PROVENTIL HFA;VENTOLIN HFA) 108 (90 Base) MCG/ACT inhaler, Inhale 2 puffs Every 4 (Four) Hours As Needed for Wheezing., Disp: , Rfl:   •  ALBUTEROL SULFATE IN, Take  by nebulization 4 (Four) Times a Day As Needed., Disp: , Rfl:   •  ALPRAZolam (XANAX) 1 MG tablet, Take 1 mg by mouth 3 (Three) Times a Day As Needed., Disp: , Rfl:   •  aspirin 81 MG EC tablet, Take 81 mg by mouth Daily. PT HOLDING FOR SURGERY, Disp: , Rfl:   •  atenolol (TENORMIN) 50 MG tablet, Take 100 mg by mouth Every Morning., Disp: , Rfl: 0  •  calcium carbonate (TUMS ULTRA 1000) 1000 MG chewable tablet, Chew 1,000 mg As Needed for Indigestion or Heartburn., Disp: , Rfl:   •  Chlorhexidine Gluconate Cloth 2 % pads, Apply 1 application topically Take As Directed. USE AS DIRECTED PREOP , Disp: , Rfl:   •  cholecalciferol (VITAMIN D3) 1000 units tablet, Take 1,000 Units by mouth Daily., Disp: , Rfl:   •  cimetidine (TAGAMET) 800 MG tablet, Take 800 mg by mouth 2 (Two) Times a Day., Disp: , Rfl:   •  Cyanocobalamin (VITAMIN B-12 IJ), Inject  as directed Every 30 (Thirty) Days. In MD office, Disp: , Rfl:   •  Ferrous  Sulfate (IRON) 325 (65 Fe) MG tablet, Take 2 tablets by mouth Every Morning., Disp: , Rfl:   •  fluticasone (FLONASE) 50 MCG/ACT nasal spray, 2 sprays into each nostril Daily., Disp: , Rfl:   •  folic acid (FOLVITE) 400 MCG tablet, Take 800 mcg by mouth Every Morning., Disp: , Rfl:   •  gabapentin (NEURONTIN) 100 MG capsule, Take 100 mg by mouth At Night As Needed., Disp: , Rfl: 0  •  HYDROcodone-acetaminophen (NORCO)  MG per tablet, Take 1 tablet by mouth 4 (Four) Times a Day., Disp: , Rfl:   •  mupirocin (BACTROBAN) 2 % nasal ointment, 1 application into each nostril Take As Directed. USE AS DIRECTED PREOP , Disp: , Rfl:   •  niacin 500 MG tablet, Take 125 mg by mouth Every Morning., Disp: , Rfl:   •  tadalafil (CIALIS) 5 MG tablet, Take 5 mg by mouth Daily As Needed for erectile dysfunction. PT HOLDING FOR SURGERY, Disp: , Rfl:   •  triamcinolone (KENALOG) 0.1 % cream, Apply 1 application topically Daily As Needed., Disp: , Rfl:   •  vitamin C (ASCORBIC ACID) 500 MG tablet, Take 1,000 mg by mouth Daily., Disp: , Rfl:     Past Medical History:   Diagnosis Date   • Acid reflux disease    • Anemia    • Anxiety    • Arthritis    • Basal cell carcinoma     REMOVED RT SHOULDER AROUND MARCH 2018   • Chest pain     PT STATES OCCASIONAL   • COPD (chronic obstructive pulmonary disease)    • Deviated septum    • Dizziness    • Fatigue    • Headache    • High triglycerides    • History of gastric ulcer    • History of staph infection     2011 RLE   • Hypertension    • Iron deficiency    • Migraine    • Rash of back     THINKS FROM HEAT, PRN CREAM   • Rotator cuff tear     right   • Seasonal allergies    • Shoulder pain    • Stomach ulcer           Past Surgical History:   Procedure Laterality Date   • INGUINAL HERNIA REPAIR Right    • LUMBAR PUNCTURE     • ROTATOR CUFF REPAIR Left           Social History     Occupational History   • Not on file.     Social History Main Topics   • Smoking status: Former Smoker      Packs/day: 3.00     Years: 20.00     Types: Cigarettes     Quit date: 1985   • Smokeless tobacco: Former User     Types: Chew     Quit date: 1993      Comment: QUIT 32 YEARS AGO   • Alcohol use 25.2 - 28.8 oz/week     42 - 48 Cans of beer per week      Comment: 6-8 beers per day   • Drug use: No   • Sexual activity: Defer      Social History     Social History Narrative   • No narrative on file          Family History   Problem Relation Age of Onset   • Malig Hyperthermia Neg Hx        Review of Systems:     Constitutional:  Denies fever, shaking or chills   Eyes:  Denies change in visual acuity   HEENT:  Denies nasal congestion or sore throat   Respiratory:  Denies cough or shortness of breath   Cardiovascular:  Denies chest pain or edema   GI:  Denies abdominal pain, nausea, vomiting, bloody stools or diarrhea   Musculoskeletal:  Denies numbness tingling or loss of motor function  Integument:  Denies rash, lesion or ulceration   Neurologic:  Denies headache or focal weakness, deficits      All other pertinent positives and negatives as noted above in HPI.    Physical Exam: 62 y.o. male     General:  Patient is awake and alert.  Appears in no acute distress or discomfort.    Psych:  Affect and demeanor are appropriate.    Eyes:  Conjunctiva and sclera appear grossly normal.  Eyes track well and EOM seem to be intact.    Ears:  No gross abnormalities.  Hearing adequate for the exam.    Cardiovascular:  Regular rate and rhythm.    Lungs:  Good chest expansion.  Breathing unlabored.    Lymph:  No palpable adenopathy about neck or axilla.    Neck:  Supple.  Normal ROM.  Negative Spurling's for shoulder or arm pain.    Right upper extremity:  Skin benign and intact without evidence for swelling, masses or atrophy.  The previous skin lesion is now resolved and the wound from his excision is healed.  No palpable masses.  Moderate tenderness over the acromioclavicular joint with a positive active compression maneuver.   Shoulder ROM limited and uncomfortable--Forward elevation 85°, external rotation 25°, internal rotation to back pocket.  No evident instability or apprehension.  Hornblowers negative.  ER lag sign positive.  Good strength in deltoid, wrist and hand.  Intact sensation in arm, hand.  Palpable radial pulse with brisk cap refill.    Diagnostic Tests:  Lab Results   Component Value Date    GLUCOSE 89 05/08/2018    CALCIUM 8.8 05/08/2018     05/08/2018    K 4.0 05/08/2018    CO2 25.4 05/08/2018     05/08/2018    BUN 19 05/08/2018    CREATININE 1.15 05/08/2018    EGFRIFNONA 64 05/08/2018    BCR 16.5 05/08/2018    ANIONGAP 14.6 05/08/2018     Lab Results   Component Value Date    WBC 7.70 05/08/2018    HGB 12.3 (L) 05/08/2018    HCT 38.5 (L) 05/08/2018    MCV 98.7 (H) 05/08/2018     05/08/2018     No results found for: INR, PROTIME    Assessment:  Right shoulder rotator cuff tear arthropathy, symptomatic acromioclavicular arthritis    Plan: We had a thorough discussion regarding the risks, benefits and alternatives to an arthroplasty and non-surgical management versus surgery.  I explained that surgical risks include infection, hematoma, hardware related complications including failure of fixation, loosening, fracture, persistent pain and/or loss of motion, iatrogenic nerve and/or blood vessel injury resulting in permanent weakness, numbness or dysfunction, DVT, PE, positioning related neuropraxia, and anesthesia related complications resulting in death.  We discussed the indication for a reverse as opposed to a standard total shoulder and the risks inherent to the reverse including notching, glenoid loosening, instability, and traction related neuropraxia, any of which could result in persistent pain or problems requiring further surgery.  We discussed the distal clavicle excision and the fact that this will necessitate a separate incision.  Risks with this includes wound healing problems, infection and  persistent pain or instability which could necessitate further intervention in the future.  Lastly, we discussed the rehab and all that will be expected of the patient post operatively to ensure an optimal outcome.  The patient voiced understanding of the risks, benefits, and alternative forms of treatment that were discussed and the patient consents to proceed with the reverse arthroplasty and open distal clavicle excision.      Date: 5/15/2018    Rafa Correa MD

## 2018-05-17 NOTE — ANESTHESIA POSTPROCEDURE EVALUATION
Patient: Chandu Yost    Procedure Summary     Date:  05/17/18 Room / Location:  [facility] OR  / [facility] MAIN OR    Anesthesia Start:  1112 Anesthesia Stop:  1318    Procedure:  Reverse Total Shoulder Arthroplasty with Distal Clavicle Excision (Right Shoulder) Diagnosis:       Rotator cuff tear arthropathy of right shoulder      (Rotator cuff tear arthropathy of right shoulder [M12.811])    Surgeon:  Rafa Correa MD Provider:  Sascha López MD    Anesthesia Type:  general, regional ASA Status:  3          Anesthesia Type: general, regional  Last vitals  BP   (!) 184/100 (05/17/18 1322)   Temp   36.4 °C (97.5 °F) (05/17/18 0958)   Pulse   62 (05/17/18 1322)   Resp   18 (05/17/18 1039)     SpO2   97 % (05/17/18 1039)     Post Anesthesia Care and Evaluation    Patient location during evaluation: PACU  Patient participation: complete - patient participated  Level of consciousness: awake and alert  Pain score: 0  Pain management: adequate  Airway patency: patent  Anesthetic complications: No anesthetic complications    Cardiovascular status: acceptable  Respiratory status: acceptable  Hydration status: acceptable    Comments: BP (!) 184/100   Pulse 62   Temp 36.4 °C (97.5 °F) (Oral)   Resp 18   SpO2 97%

## 2018-05-18 VITALS
RESPIRATION RATE: 18 BRPM | HEIGHT: 67 IN | HEART RATE: 75 BPM | BODY MASS INDEX: 28.24 KG/M2 | TEMPERATURE: 97 F | WEIGHT: 179.9 LBS | DIASTOLIC BLOOD PRESSURE: 62 MMHG | SYSTOLIC BLOOD PRESSURE: 105 MMHG | OXYGEN SATURATION: 94 %

## 2018-05-18 LAB
HCT VFR BLD AUTO: 35 % (ref 40.4–52.2)
HGB BLD-MCNC: 11.4 G/DL (ref 13.7–17.6)

## 2018-05-18 PROCEDURE — 25010000003 CEFAZOLIN IN DEXTROSE 2-4 GM/100ML-% SOLUTION: Performed by: ORTHOPAEDIC SURGERY

## 2018-05-18 PROCEDURE — 85018 HEMOGLOBIN: CPT | Performed by: ORTHOPAEDIC SURGERY

## 2018-05-18 PROCEDURE — 25010000002 VANCOMYCIN 10 G RECONSTITUTED SOLUTION: Performed by: ORTHOPAEDIC SURGERY

## 2018-05-18 PROCEDURE — 85014 HEMATOCRIT: CPT | Performed by: ORTHOPAEDIC SURGERY

## 2018-05-18 RX ORDER — OXYCODONE AND ACETAMINOPHEN 10; 325 MG/1; MG/1
TABLET ORAL
Qty: 60 TABLET | Refills: 0
Start: 2018-05-18 | End: 2018-05-23 | Stop reason: SDUPTHER

## 2018-05-18 RX ORDER — PSEUDOEPHEDRINE HCL 30 MG
100 TABLET ORAL 2 TIMES DAILY
Start: 2018-05-18

## 2018-05-18 RX ADMIN — DOCUSATE SODIUM 100 MG: 100 CAPSULE, LIQUID FILLED ORAL at 09:11

## 2018-05-18 RX ADMIN — OXYCODONE HYDROCHLORIDE AND ACETAMINOPHEN 2 TABLET: 10; 325 TABLET ORAL at 09:10

## 2018-05-18 RX ADMIN — VANCOMYCIN HYDROCHLORIDE 1250 MG: 10 INJECTION, POWDER, LYOPHILIZED, FOR SOLUTION INTRAVENOUS at 00:58

## 2018-05-18 RX ADMIN — FAMOTIDINE 10 MG: 20 TABLET, FILM COATED ORAL at 09:12

## 2018-05-18 RX ADMIN — MUPIROCIN: 20 OINTMENT TOPICAL at 09:11

## 2018-05-18 RX ADMIN — CEFAZOLIN SODIUM 2 G: 2 INJECTION, SOLUTION INTRAVENOUS at 03:59

## 2018-05-18 RX ADMIN — OXYCODONE HYDROCHLORIDE AND ACETAMINOPHEN 2 TABLET: 10; 325 TABLET ORAL at 04:01

## 2018-05-18 RX ADMIN — ATENOLOL 100 MG: 50 TABLET ORAL at 09:12

## 2018-05-18 RX ADMIN — OXYCODONE HYDROCHLORIDE AND ACETAMINOPHEN 2 TABLET: 10; 325 TABLET ORAL at 15:13

## 2018-05-18 NOTE — DISCHARGE SUMMARY
Date of Admission:  5/17/2018    Date of Discharge:  5/18/2018    Discharge Diagnosis: s/p Right reverse total shoulder arthroplasty, distal clavicle excision    Admitting Physician: Rafa Correa    Consults: none    DETAILS OF HOSPITAL STAY:  Patient is a 62 y.o. male was admitted to the floor following a reverse total shoulder arthroplasty.  Post-operatively the patient was transferred to the post-operative floor where the patient underwent physical therapy including both active and passive ROM exercises. Opioids were titrated to achieve appropriate pain management to allow for participation in mobilization exercises. Vital signs are now stable. The incision is benign without signs or symptoms of infection. Operative extremity neurovascular status remains intact. Appropriate education re: incision care, activity levels, medications, and follow up visits was completed and all questions were answered. The patient is now deemed stable for discharge to home.    Condition on Discharge:  Stable    Discharge Medications   Chandu Yost   Home Medication Instructions MARY:[id number]    Printed on:05/18/18 0708   Medication Information                      albuterol (PROVENTIL HFA;VENTOLIN HFA) 108 (90 Base) MCG/ACT inhaler  Inhale 2 puffs Every 4 (Four) Hours As Needed for Wheezing.             ALBUTEROL SULFATE IN  Take  by nebulization 4 (Four) Times a Day As Needed.             ALPRAZolam (XANAX) 1 MG tablet  Take 1 mg by mouth 3 (Three) Times a Day As Needed.             aspirin 81 MG EC tablet  Take 81 mg by mouth Daily. PT HOLDING FOR SURGERY             atenolol (TENORMIN) 50 MG tablet  Take 100 mg by mouth Every Morning.             calcium carbonate (TUMS ULTRA 1000) 1000 MG chewable tablet  Chew 1,000 mg As Needed for Indigestion or Heartburn.             Chlorhexidine Gluconate Cloth 2 % pads  Apply 1 application topically Take As Directed. USE AS DIRECTED PREOP              cholecalciferol (VITAMIN  D3) 1000 units tablet  Take 1,000 Units by mouth Daily.             cimetidine (TAGAMET) 800 MG tablet  Take 800 mg by mouth 2 (Two) Times a Day.             Cyanocobalamin (VITAMIN B-12 IJ)  Inject  as directed Every 30 (Thirty) Days. In MD office             Ferrous Sulfate (IRON) 325 (65 Fe) MG tablet  Take 2 tablets by mouth Every Morning.             fluticasone (FLONASE) 50 MCG/ACT nasal spray  2 sprays into each nostril Daily.             folic acid (FOLVITE) 400 MCG tablet  Take 800 mcg by mouth Every Morning.             gabapentin (NEURONTIN) 100 MG capsule  Take 100 mg by mouth At Night As Needed.             HYDROcodone-acetaminophen (NORCO)  MG per tablet  Take 1 tablet by mouth 4 (Four) Times a Day.             mupirocin (BACTROBAN) 2 % nasal ointment  1 application into each nostril Take As Directed. USE AS DIRECTED PREOP              niacin 500 MG tablet  Take 125 mg by mouth Every Morning.             tadalafil (CIALIS) 5 MG tablet  Take 5 mg by mouth Daily As Needed for erectile dysfunction. PT HOLDING FOR SURGERY             triamcinolone (KENALOG) 0.1 % cream  Apply 1 application topically Daily As Needed.             vitamin C (ASCORBIC ACID) 500 MG tablet  Take 1,000 mg by mouth Daily.                 Discharge Diet: regular    Activity at Discharge: as tolerated    Discharge Instructions:   1)  Patient is to continue with physical therapy exercises daily and continue working with the physical therapist as ordered.  2)  Continue to follow precautions as instructed.   3)  Patient is instructed to continue use of the sling except when performing their physical therapy exercising and while dressing or showering.  4)  Continue ELICEO hose daily and ice regularly.  Patient also instructed on incentive spirometer during hospitalization and encouraged to continue to use at home regularly.   5)  The dressing should be left in place. If waterproof dressing is intact the patient may shower immediately  following discharge. If the dressing becomes disloged or saturated it should be changed and patient must wait until POD #5 to shower. If dressing is changed, apply dry sterile dressing after showering.  6)  Follow up appointment in 2 weeks - patient to call the office at [phone] to schedule.     Follow-up Appointments  2 weeks with Dr Madeline Correa MD  05/18/18  2:14 PM

## 2018-05-18 NOTE — PLAN OF CARE
Problem: Patient Care Overview  Goal: Plan of Care Review  Outcome: Ongoing (interventions implemented as appropriate)   05/18/18 3326   Coping/Psychosocial   Plan of Care Reviewed With patient;significant other   Plan of Care Review   Progress improving   OTHER   Outcome Summary VSS. Pain well controlled with PO analgesic. Beginning to have sensation return to RUE secondary to anesthetic block. Wiggles fingers slightly. Ambulating with SBA. Voiding without problem. Peripheral/neurovascular assessment WNL. Verbalizes understanding of all educational topics to include management of hypertenson. Plans to D/C home today       Problem: Fall Risk (Adult)  Goal: Absence of Fall  Outcome: Ongoing (interventions implemented as appropriate)      Problem: Pain, Acute (Adult)  Goal: Acceptable Pain Control/Comfort Level  Outcome: Ongoing (interventions implemented as appropriate)      Problem: Shoulder Arthroplasty (Adult)  Goal: Signs and Symptoms of Listed Potential Problems Will be Absent, Minimized or Managed (Shoulder Arthroplasty)  Outcome: Ongoing (interventions implemented as appropriate)

## 2018-05-18 NOTE — SIGNIFICANT NOTE
05/18/18 1033   Rehab Time/Intention   Evaluation Not Performed other (see comments)  (Instructed pt. on TSR H.E.P. (Given in written, pictorial format for home use; Pendulums 5-6x's/day). Educated pt. on proper sling placement for optimal support. Pt./family with no further questions/concerns regarding functional mobility. Will sign off.)   Rehab Treatment   Discipline physical therapist

## 2018-05-18 NOTE — NURSING NOTE
PT unable to move LUE without occluding IV catheter. Unable to administer fluids after pt keeping arm straight. Infusion stopped and IV therapy notified for request for new IV site so IV antibiotics may be administered.

## 2018-05-18 NOTE — PLAN OF CARE
Problem: Patient Care Overview  Goal: Plan of Care Review  Outcome: Ongoing (interventions implemented as appropriate)   05/17/18 1956   Coping/Psychosocial   Plan of Care Reviewed With patient;significant other   Plan of Care Review   Progress improving   OTHER   Outcome Summary Pt arrived to floor 1540 right reverse total shoulder w/distal clavicle excision, pt voided , eating and drinking well, on RA, has sensation in RUE, weak  only in right hand, dressing c/d/i, drain moderate drainage, educated on bp monitoring     Goal: Individualization and Mutuality  Outcome: Ongoing (interventions implemented as appropriate)      Problem: Fall Risk (Adult)  Goal: Identify Related Risk Factors and Signs and Symptoms  Outcome: Outcome(s) achieved Date Met: 05/17/18    Goal: Absence of Fall  Outcome: Ongoing (interventions implemented as appropriate)      Problem: Pain, Acute (Adult)  Goal: Identify Related Risk Factors and Signs and Symptoms  Outcome: Outcome(s) achieved Date Met: 05/17/18    Goal: Acceptable Pain Control/Comfort Level  Outcome: Ongoing (interventions implemented as appropriate)      Problem: Self-Care Deficit (Adult,Obstetrics,Pediatric)  Goal: Identify Related Risk Factors and Signs and Symptoms  Outcome: Outcome(s) achieved Date Met: 05/17/18    Goal: Improved Ability to Perform BADL and IADL  Outcome: Ongoing (interventions implemented as appropriate)

## 2018-05-18 NOTE — PLAN OF CARE
Problem: Patient Care Overview  Goal: Plan of Care Review  Outcome: Ongoing (interventions implemented as appropriate)   05/18/18 8102   Coping/Psychosocial   Plan of Care Reviewed With patient;caregiver   OTHER   Outcome Summary VSS, afebrile, worked with therapy today, Right shoulder with arm in sling, Drain dc'd w/o difficulty, able to move fingers slightly, Pain controlled with po pain medication, IV dc'd , discharge instructions reviewed with pt and sig other. and all questtions answers.       Problem: Fall Risk (Adult)  Goal: Absence of Fall  Outcome: Ongoing (interventions implemented as appropriate)      Problem: Pain, Acute (Adult)  Goal: Acceptable Pain Control/Comfort Level  Outcome: Ongoing (interventions implemented as appropriate)      Problem: Self-Care Deficit (Adult,Obstetrics,Pediatric)  Goal: Improved Ability to Perform BADL and IADL  Outcome: Ongoing (interventions implemented as appropriate)      Problem: Shoulder Arthroplasty (Adult)  Goal: Signs and Symptoms of Listed Potential Problems Will be Absent, Minimized or Managed (Shoulder Arthroplasty)  Outcome: Ongoing (interventions implemented as appropriate)    Goal: Anesthesia/Sedation Recovery  Outcome: Outcome(s) achieved Date Met: 05/18/18

## 2018-05-18 NOTE — NURSING NOTE
Nurse able to reposition and reinforce IV site to run continuously without occluding. Notified IV nurse. IVF restarted and Vanc started.

## 2018-05-23 DIAGNOSIS — M12.811 ROTATOR CUFF TEAR ARTHROPATHY OF RIGHT SHOULDER: ICD-10-CM

## 2018-05-23 DIAGNOSIS — M75.101 ROTATOR CUFF TEAR ARTHROPATHY OF RIGHT SHOULDER: ICD-10-CM

## 2018-05-23 RX ORDER — OXYCODONE AND ACETAMINOPHEN 10; 325 MG/1; MG/1
TABLET ORAL
Qty: 60 TABLET | Refills: 0
Start: 2018-05-23 | End: 2018-05-23 | Stop reason: SDUPTHER

## 2018-05-23 RX ORDER — OXYCODONE AND ACETAMINOPHEN 10; 325 MG/1; MG/1
TABLET ORAL
Qty: 60 TABLET | Refills: 0 | Status: SHIPPED | OUTPATIENT
Start: 2018-05-23 | End: 2018-06-29 | Stop reason: SDUPTHER

## 2018-05-23 NOTE — TELEPHONE ENCOUNTER
Patient called stating ok for his lupillo's daughter, Rachel López, to  his prescription. Informed him she must have a picture ID. /[place]

## 2018-06-01 ENCOUNTER — OFFICE VISIT (OUTPATIENT)
Dept: ORTHOPEDIC SURGERY | Facility: CLINIC | Age: 63
End: 2018-06-01

## 2018-06-01 VITALS — WEIGHT: 170 LBS | TEMPERATURE: 98.2 F | BODY MASS INDEX: 26.68 KG/M2 | HEIGHT: 67 IN

## 2018-06-01 DIAGNOSIS — Z96.611 S/P REVERSE TOTAL SHOULDER ARTHROPLASTY, RIGHT: Primary | ICD-10-CM

## 2018-06-01 PROCEDURE — 99024 POSTOP FOLLOW-UP VISIT: CPT | Performed by: ORTHOPAEDIC SURGERY

## 2018-06-01 PROCEDURE — 73030 X-RAY EXAM OF SHOULDER: CPT | Performed by: ORTHOPAEDIC SURGERY

## 2018-06-01 RX ORDER — OXYCODONE AND ACETAMINOPHEN 10; 325 MG/1; MG/1
1 TABLET ORAL EVERY 4 HOURS PRN
Qty: 50 TABLET | Refills: 0 | Status: SHIPPED | OUTPATIENT
Start: 2018-06-01 | End: 2018-06-19 | Stop reason: SDUPTHER

## 2018-06-03 NOTE — PROGRESS NOTES
"Chandu Mcintyrefield : 1955 MRN: [id number] DATE: 6/3/2018    DIAGNOSIS:  2 week follow up right shoulder arthroplasty and distal clavicle excision    SUBJECTIVE:  Patient returns today for 2 week follow up of right shoulder replacement. Patient reports doing well with no unusual complaints.      OBJECTIVE:    Temp 98.2 °F (36.8 °C) (Temporal Artery )   Ht 170.2 cm (67\")   Wt 77.1 kg (170 lb)   BMI 26.63 kg/m²     Exam:. The incision is well approximated. No erythema or drainage. Shoulder moves fluidly with pendulums . The arm is soft and nontender.  Good strength in hand and wrist.  Distal sensation intact.  Palpable radial pulse.    DIAGNOSTIC STUDIES    Xrays: 2 views of the right shoulder (AP, scapular Y) were ordered and reviewed for evaluation of shoulder replacement.  They demonstrate a well positioned, well aligned replacement without complicating factors noted.  In comparison with previous films, there has been no change.    ASSESSMENT: 2 week follow up right shoulder replacement and distal clavicle excision    PLAN:   1.  Begin PT per protocol--prescription given along with 2 copies of my protocol.  2.  Continue sling until next visit.  3.  Counseled patient about appropriate activity modifications and restrictions, including no driving at this point.    Rafa Correa MD    "

## 2018-06-04 ENCOUNTER — TELEPHONE (OUTPATIENT)
Dept: ORTHOPEDIC SURGERY | Facility: CLINIC | Age: 63
End: 2018-06-04

## 2018-06-19 NOTE — TELEPHONE ENCOUNTER
Pat req a rx refill for Percocet 10-325MG, Pat has #10 left but starts PT soon. Please advise. Thank you!

## 2018-06-20 ENCOUNTER — TELEPHONE (OUTPATIENT)
Dept: ORTHOPEDIC SURGERY | Facility: CLINIC | Age: 63
End: 2018-06-20

## 2018-06-20 RX ORDER — OXYCODONE AND ACETAMINOPHEN 10; 325 MG/1; MG/1
1 TABLET ORAL EVERY 4 HOURS PRN
Qty: 50 TABLET | Refills: 0 | Status: SHIPPED | OUTPATIENT
Start: 2018-06-20 | End: 2018-07-16 | Stop reason: DRUGHIGH

## 2018-06-29 ENCOUNTER — OFFICE VISIT (OUTPATIENT)
Dept: ORTHOPEDIC SURGERY | Facility: CLINIC | Age: 63
End: 2018-06-29

## 2018-06-29 VITALS — TEMPERATURE: 98.3 F | HEIGHT: 67 IN | BODY MASS INDEX: 26.68 KG/M2 | WEIGHT: 170 LBS

## 2018-06-29 DIAGNOSIS — Z96.611 S/P REVERSE TOTAL SHOULDER ARTHROPLASTY, RIGHT: Primary | ICD-10-CM

## 2018-06-29 PROBLEM — M50.30 DDD (DEGENERATIVE DISC DISEASE), CERVICAL: Status: ACTIVE | Noted: 2018-06-29

## 2018-06-29 PROBLEM — I10 ESSENTIAL HYPERTENSION: Status: ACTIVE | Noted: 2018-06-29

## 2018-06-29 PROBLEM — F10.10 ALCOHOL ABUSE: Status: ACTIVE | Noted: 2018-06-29

## 2018-06-29 PROBLEM — M51.37 DEGENERATION OF LUMBOSACRAL INTERVERTEBRAL DISC: Status: ACTIVE | Noted: 2018-06-29

## 2018-06-29 PROBLEM — F33.9 RECURRENT MAJOR DEPRESSION (HCC): Status: ACTIVE | Noted: 2018-06-29

## 2018-06-29 PROCEDURE — 99024 POSTOP FOLLOW-UP VISIT: CPT | Performed by: ORTHOPAEDIC SURGERY

## 2018-06-29 PROCEDURE — 73030 X-RAY EXAM OF SHOULDER: CPT | Performed by: ORTHOPAEDIC SURGERY

## 2018-06-29 RX ORDER — OXYCODONE AND ACETAMINOPHEN 10; 325 MG/1; MG/1
1 TABLET ORAL EVERY 4 HOURS PRN
Qty: 50 TABLET | Refills: 0 | Status: SHIPPED | OUTPATIENT
Start: 2018-06-29 | End: 2018-07-16 | Stop reason: DRUGHIGH

## 2018-06-29 RX ORDER — ATORVASTATIN CALCIUM 10 MG/1
TABLET, FILM COATED ORAL
COMMUNITY

## 2018-06-29 RX ORDER — HYDROCODONE BITARTRATE AND ACETAMINOPHEN 10; 325 MG/1; MG/1
TABLET ORAL
COMMUNITY
End: 2018-06-29

## 2018-06-29 RX ORDER — RANITIDINE 150 MG/1
150 TABLET ORAL 3 TIMES DAILY
COMMUNITY

## 2018-06-29 NOTE — PROGRESS NOTES
"Chandu Mcintyrefield : 1955 MRN: [id number] DATE: 2018    DIAGNOSIS: 6 week follow up right shoulder arthroplasty, DCE    SUBJECTIVE:  Patient returns today for 6 week follow up of right shoulder replacement. Patient reports doing well with no unusual complaints.     OBJECTIVE:    Temp 98.3 °F (36.8 °C)   Ht 170.2 cm (67\")   Wt 77.1 kg (170 lb)   BMI 26.63 kg/m²     Exam:. The incision is well healed. No erythema or drainage. Shoulder moves fluidly with pendulums.  Motion is on track per protocol.  The arm is soft and nontender.  Good motor and sensory function.  Palpable distal pulses.     DIAGNOSTIC STUDIES    Xrays: 2 views of the right shoulder (AP, scapular Y) were ordered and reviewed for evaluation of shoulder replacement. They demonstrate a well positioned, well aligned replacement without complicating factors noted. In comparison with previous films there has been no change.    ASSESSMENT: 6 week follow up right shoulder replacement, DCE    PLAN:   1.  Continue PT per protocol.  2.  Discontinue sling and begin working on progressing ROM as tolerated.  3.  Counseled patient about appropriate activity modifications and restrictions--released to drive at this point.  4.  Follow up in 3 months  5.  I did agree to refill his pain medicine.  Risks were discussed    Rafa Correa MD    "

## 2018-07-16 ENCOUNTER — TELEPHONE (OUTPATIENT)
Dept: ORTHOPEDIC SURGERY | Facility: CLINIC | Age: 63
End: 2018-07-16

## 2018-07-16 RX ORDER — OXYCODONE AND ACETAMINOPHEN 7.5; 325 MG/1; MG/1
1 TABLET ORAL EVERY 4 HOURS PRN
Qty: 42 TABLET | Refills: 0 | Status: SHIPPED | OUTPATIENT
Start: 2018-07-16 | End: 2018-07-23

## 2018-07-16 NOTE — TELEPHONE ENCOUNTER
SX 05/17/18 Reverse Total Shoulder Arthroplasty with Distal Clavicle Excision-RIGHT     PER ROBERTO LAST FILLED PERCOCET 10/325MG #50 8 DAYS -0701/18    kof

## 2018-07-16 NOTE — TELEPHONE ENCOUNTER
Patient request refill: Oxycodone 10/325mg., si po q 4hrs prn pain, #50 written on . PATIENT HAS #6 TABS LEFT.   He says he thinks we call it to his pharmacy because he lives in [place].

## 2018-07-31 ENCOUNTER — TELEPHONE (OUTPATIENT)
Dept: ORTHOPEDIC SURGERY | Facility: CLINIC | Age: 63
End: 2018-07-31

## 2018-10-05 ENCOUNTER — OFFICE VISIT (OUTPATIENT)
Dept: ORTHOPEDIC SURGERY | Facility: CLINIC | Age: 63
End: 2018-10-05

## 2018-10-05 VITALS — WEIGHT: 170 LBS | TEMPERATURE: 98.5 F | BODY MASS INDEX: 26.68 KG/M2 | HEIGHT: 67 IN

## 2018-10-05 DIAGNOSIS — Z09 SURGERY FOLLOW-UP: Primary | ICD-10-CM

## 2018-10-05 PROCEDURE — 99024 POSTOP FOLLOW-UP VISIT: CPT | Performed by: NURSE PRACTITIONER

## 2018-10-05 PROCEDURE — 73030 X-RAY EXAM OF SHOULDER: CPT | Performed by: NURSE PRACTITIONER

## 2018-10-05 NOTE — PROGRESS NOTES
"Chandu Yost : 1955 MRN: [id number] DATE: 10/5/2018    DIAGNOSIS: 4 1/2 month follow up right shoulder arthroplasty      SUBJECTIVE:  Patient returns today for 4 1/2month follow up of right shoulder replacement.  Patient reports pain described as moderate, aching, and constant.  Also, says his pain varies day to day in intensity.  He states, \"Sometimes it hurts worse if I high over do it\".  He takes narcotics prescribed by his pain management physician which helps somewhat.  Reports he is doing physical therapy exercises 5 days a week at home.  Reports he does not see the need in driving to and from the physical therapy facility when he can do everything at home.  He feels he is making good progress with motion, but complains of weakness.    OBJECTIVE:    Temp 98.5 °F (36.9 °C) (Temporal Artery )   Ht 170.2 cm (67\")   Wt 77.1 kg (170 lb)   BMI 26.63 kg/m²     Review of Systems negative except as above    Exam:. The incision is well healed. No effusion.  Range of motion is measured at 140° for elevation, 35° external rotation, and internal rotation to T12.  The arm is soft and nontender.  Good strength with elevation and abduction.  Sensation intact distally.  Brisk cap refill.    DIAGNOSTIC STUDIES    Xrays: 3 views of the right shoulder (AP, scapular Y and axillary) were ordered and reviewed for evaluation of shoulder replacement. They demonstrate a well positioned, well aligned replacement without complicating factors noted. In comparison with previous films there has been no change.    ASSESSMENT: 4 1/2 month follow up right shoulder replacement.    PLAN:    1.  Continue activities as tolerated.  We discuss activity modifications.  2.  Continue PT exercises at home.  3.  I explained that one can expect continued improvement in motion, function, and any residual discomfort for up to 1 year after surgery.  4.  Follow up with Dr. Correa in 6 weeks.    FIDENCIO Beltrán    "

## 2019-01-11 ENCOUNTER — OFFICE VISIT (OUTPATIENT)
Dept: ORTHOPEDIC SURGERY | Facility: CLINIC | Age: 64
End: 2019-01-11

## 2019-01-11 VITALS — TEMPERATURE: 97.7 F | BODY MASS INDEX: 26.37 KG/M2 | WEIGHT: 168 LBS | HEIGHT: 67 IN

## 2019-01-11 DIAGNOSIS — Z96.611 S/P SHOULDER REPLACEMENT, RIGHT: Primary | ICD-10-CM

## 2019-01-11 PROCEDURE — 73030 X-RAY EXAM OF SHOULDER: CPT | Performed by: ORTHOPAEDIC SURGERY

## 2019-01-11 PROCEDURE — 99212 OFFICE O/P EST SF 10 MIN: CPT | Performed by: ORTHOPAEDIC SURGERY

## 2019-01-11 RX ORDER — METHYLPREDNISOLONE 4 MG/1
TABLET ORAL
Qty: 21 TABLET | Refills: 0 | Status: SHIPPED | OUTPATIENT
Start: 2019-01-11

## 2019-01-13 NOTE — PROGRESS NOTES
Chief Complaint:  Follow-up status post right reverse total shoulder arthroplasty    HPI:  Mr. Yost comes in for follow-up of his right shoulder.  He has been having increased anterior discomfort over the past month.  He admits that he has been very active.  He has also noticed a prominence in the front of his right shoulder as compared to the left.    Exam:  Right shoulder is examined.  Skin is benign.  No effusion.  Surgical incision is well-healed.  His coracoid process on the right is a little prominent compared to the left.  There is some squaring of the right shoulder as compared to the left as well due to the tension in his deltoid.  He is tender over the conjoined tendon.  He has good shoulder motion.  Mild discomfort with elevation.    Imaging:  AP, scapular Y and axillary views of the right shoulder are ordered, reviewed, and compared to previous x-rays.  Components appear well fixed and well positioned.  No complicating process noted.    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  Risks of the medicine were discussed.  As his symptoms subside, he can resume normal activity.  I will see him back in 3 months.    Rafa Correa MD  01/11/2019

## (undated) DEVICE — NDL SPINE 22G 31/2IN BLK

## (undated) DEVICE — BIT DRL SCRW PERIPH 2.7MM

## (undated) DEVICE — BNDG ELAS ELITE V/CLOSE 3IN 5YD LF STRL

## (undated) DEVICE — DRSNG TELFA PAD NONADH STR 1S 3X8IN

## (undated) DEVICE — MAT FLR ABSORBENT LG 4FT 10 2.5FT

## (undated) DEVICE — ADHS SKIN DERMABOND TOP ADVANCED

## (undated) DEVICE — SYR LUERLOK 30CC

## (undated) DEVICE — TUBING, SUCTION, 1/4" X 20', STRAIGHT: Brand: MEDLINE INDUSTRIES, INC.

## (undated) DEVICE — GLV SURG BIOGEL LTX PF 8 1/2

## (undated) DEVICE — DRSNG TELFA PAD NONADH STR 1S 3X4IN

## (undated) DEVICE — PK SHLDR OPN 40

## (undated) DEVICE — SUT SILK 2/0 TIES 18IN A185H

## (undated) DEVICE — 2108 SERIES SAGITTAL BLADE (19.5 X 1.27 X 81.0MM)

## (undated) DEVICE — SKIN PREP TRAY W/CHG: Brand: MEDLINE INDUSTRIES, INC.

## (undated) DEVICE — DRSNG SURESITE WNDW 4X4.5

## (undated) DEVICE — PRECISION THIN (9.0 X 0.38 X 25.0MM)

## (undated) DEVICE — GLV SURG TRIUMPH CLASSIC PF LTX 8.5 STRL

## (undated) DEVICE — 3M™ IOBAN™ 2 ANTIMICROBIAL INCISE DRAPE 6640EZ: Brand: IOBAN™ 2

## (undated) DEVICE — PIN STNMAN 3.2MM 9IN
Type: IMPLANTABLE DEVICE | Site: SHOULDER | Status: NON-FUNCTIONAL
Removed: 2018-05-17

## (undated) DEVICE — SOL ISO/ALC RUB 70PCT 4OZ

## (undated) DEVICE — PAD,ABDOMINAL,8"X10",ST,LF: Brand: MEDLINE

## (undated) DEVICE — DRSNG SURESITE WNDW 2.38X2.75

## (undated) DEVICE — POOLE SUCTION HANDLE: Brand: CARDINAL HEALTH

## (undated) DEVICE — APPL CHLORAPREP W/TINT 26ML ORNG

## (undated) DEVICE — SUT VIC 2/0 CT2 27IN J269H

## (undated) DEVICE — HANDPIECE SET WITH COAXIAL HIGH FLOW TIP AND SUCTION TUBE: Brand: INTERPULSE

## (undated) DEVICE — PREP SOL POVIDONE/IODINE BT 4OZ

## (undated) DEVICE — DRSNG WND GZ CURAD OIL EMULSION 3X3IN STRL

## (undated) DEVICE — DRAPE,U/ SHT,SPLIT,PLAS,STERIL: Brand: MEDLINE

## (undated) DEVICE — WEBRIL* CAST PADDING: Brand: DEROYAL